# Patient Record
Sex: MALE | Race: BLACK OR AFRICAN AMERICAN | NOT HISPANIC OR LATINO | Employment: FULL TIME | URBAN - METROPOLITAN AREA
[De-identification: names, ages, dates, MRNs, and addresses within clinical notes are randomized per-mention and may not be internally consistent; named-entity substitution may affect disease eponyms.]

---

## 2019-10-24 RX ORDER — AZITHROMYCIN 250 MG/1
TABLET, FILM COATED ORAL
Qty: 6 TABLET | Refills: 0 | Status: SHIPPED | OUTPATIENT
Start: 2019-10-24 | End: 2019-10-29

## 2019-10-24 RX ORDER — PROMETHAZINE HYDROCHLORIDE AND CODEINE PHOSPHATE 6.25; 1 MG/5ML; MG/5ML
10 SOLUTION ORAL EVERY 6 HOURS PRN
Qty: 240 ML | Refills: 0 | Status: SHIPPED | OUTPATIENT
Start: 2019-10-24 | End: 2019-10-31

## 2019-12-12 DIAGNOSIS — I10 HTN (HYPERTENSION), BENIGN: Primary | ICD-10-CM

## 2019-12-12 RX ORDER — AMLODIPINE BESYLATE 5 MG/1
5 TABLET ORAL DAILY
Qty: 90 TABLET | Refills: 1 | Status: SHIPPED | OUTPATIENT
Start: 2019-12-12 | End: 2019-12-16 | Stop reason: SDUPTHER

## 2019-12-12 RX ORDER — AMLODIPINE BESYLATE 5 MG/1
5 TABLET ORAL DAILY
COMMUNITY
End: 2019-12-12 | Stop reason: SDUPTHER

## 2019-12-13 NOTE — TELEPHONE ENCOUNTER
The patient's prescription has been approved and sent to   [id number] - [place], [place] - [address] AT [facility] OF [facility] [address]  [address]  [place] [address]  Phone: [phone] Fax: [phone]

## 2019-12-16 DIAGNOSIS — I10 HTN (HYPERTENSION), BENIGN: ICD-10-CM

## 2019-12-16 RX ORDER — AMLODIPINE BESYLATE 5 MG/1
5 TABLET ORAL DAILY
Qty: 90 TABLET | Refills: 1 | Status: SHIPPED | OUTPATIENT
Start: 2019-12-16 | End: 2020-06-30 | Stop reason: SDUPTHER

## 2019-12-16 NOTE — TELEPHONE ENCOUNTER
----- Message from Ainsley Murcia sent at 12/16/2019  1:31 PM CST -----  Contact: Korey  The patient LMOR. He wanted his b/p medication filled. I called him back and LMOR AND said we need the name of his medication and what pharmacy he uses. pts # [phone] GH

## 2019-12-17 NOTE — TELEPHONE ENCOUNTER
The patient's prescription has been approved and sent to   [id number] - [place], [place] - [address] AT [facility] OF [facility] 43  [address]  [place] [address]  Phone: [phone] Fax: [phone]

## 2020-01-30 ENCOUNTER — TELEPHONE (OUTPATIENT)
Dept: FAMILY MEDICINE | Facility: CLINIC | Age: 60
End: 2020-01-30

## 2020-01-31 ENCOUNTER — OFFICE VISIT (OUTPATIENT)
Dept: FAMILY MEDICINE | Facility: CLINIC | Age: 60
End: 2020-01-31
Payer: COMMERCIAL

## 2020-01-31 DIAGNOSIS — Z79.899 LONG-TERM USE OF HIGH-RISK MEDICATION: ICD-10-CM

## 2020-01-31 DIAGNOSIS — J30.9 ALLERGIC RHINITIS, UNSPECIFIED SEASONALITY, UNSPECIFIED TRIGGER: ICD-10-CM

## 2020-01-31 DIAGNOSIS — Z13.6 SCREENING FOR ISCHEMIC HEART DISEASE (IHD): ICD-10-CM

## 2020-01-31 DIAGNOSIS — Z86.010 HX OF COLONIC POLYP: ICD-10-CM

## 2020-01-31 DIAGNOSIS — L30.9 DERMATITIS: ICD-10-CM

## 2020-01-31 DIAGNOSIS — Z13.89 SCREENING FOR BLOOD OR PROTEIN IN URINE: ICD-10-CM

## 2020-01-31 DIAGNOSIS — I10 HTN (HYPERTENSION), BENIGN: Primary | ICD-10-CM

## 2020-01-31 PROCEDURE — 3074F SYST BP LT 130 MM HG: CPT | Mod: S$GLB,,, | Performed by: FAMILY MEDICINE

## 2020-01-31 PROCEDURE — 99214 OFFICE O/P EST MOD 30 MIN: CPT | Mod: S$GLB,,, | Performed by: FAMILY MEDICINE

## 2020-01-31 PROCEDURE — 3078F PR MOST RECENT DIASTOLIC BLOOD PRESSURE < 80 MM HG: ICD-10-PCS | Mod: S$GLB,,, | Performed by: FAMILY MEDICINE

## 2020-01-31 PROCEDURE — 3078F DIAST BP <80 MM HG: CPT | Mod: S$GLB,,, | Performed by: FAMILY MEDICINE

## 2020-01-31 PROCEDURE — 99214 PR OFFICE/OUTPT VISIT, EST, LEVL IV, 30-39 MIN: ICD-10-PCS | Mod: S$GLB,,, | Performed by: FAMILY MEDICINE

## 2020-01-31 PROCEDURE — 3074F PR MOST RECENT SYSTOLIC BLOOD PRESSURE < 130 MM HG: ICD-10-PCS | Mod: S$GLB,,, | Performed by: FAMILY MEDICINE

## 2020-01-31 RX ORDER — KETOCONAZOLE 20 MG/G
CREAM TOPICAL DAILY
Qty: 30 G | Refills: 1 | Status: SHIPPED | OUTPATIENT
Start: 2020-01-31 | End: 2021-10-14

## 2020-01-31 RX ORDER — PNV NO.95/FERROUS FUM/FOLIC AC 28MG-0.8MG
TABLET ORAL
COMMUNITY
End: 2021-10-14

## 2020-01-31 RX ORDER — CIDER VINEGAR 300 MG
TABLET ORAL
COMMUNITY

## 2020-01-31 NOTE — PROGRESS NOTES
SUBJECTIVE:    Patient ID: Korey Aguero is a 59 y.o. male.    Chief Complaint: Rash (right arm x 2 months)    Pt here to checkup on chronic conditions.    BP is doing ok, denies CP/SOB.    Allergies are doing ok.  Has not been having to use nasal spray.       Has a rash on his right arm for the last two months.  It itches.  Do not       No FMHx of psoriasis      -------------------------------------------  cscope due May 2019, has been to see Dr. Srivastava.      No visits with results within 6 Month(s) from this visit.   Latest known visit with results is:   No results found for any previous visit.       History reviewed. No pertinent past medical history.  History reviewed. No pertinent surgical history.  History reviewed. No pertinent family history.    Marital Status:   Alcohol History:  has no alcohol history on file.  Tobacco History:  has no tobacco history on file.  Drug History:  has no drug history on file.    Review of patient's allergies indicates:   Allergen Reactions    Pcn [penicillins]        Current Outpatient Medications:     amLODIPine (NORVASC) 5 MG tablet, Take 1 tablet (5 mg total) by mouth once daily., Disp: 90 tablet, Rfl: 1    apple cider vinegar 300 mg Tab, Take by mouth., Disp: , Rfl:     krill-omega-3-dha-epa-lipids (KRILL OIL) 274-07-33-50 mg Cap, Take by mouth., Disp: , Rfl:     UNABLE TO FIND, Take 2 tablets by mouth once daily. [place], Disp: , Rfl:     ketoconazole (NIZORAL) 2 % cream, Apply topically once daily., Disp: 30 g, Rfl: 1    Review of Systems   Constitutional: Negative for appetite change, fatigue, fever and unexpected weight change.   Respiratory: Negative for cough, chest tightness, shortness of breath and wheezing.    Cardiovascular: Negative for chest pain and leg swelling.   Gastrointestinal: Negative for abdominal pain, constipation, nausea and vomiting.        -heartburn   Genitourinary: Negative for decreased urine volume, difficulty  "urinating, dysuria and frequency.   Musculoskeletal: Negative for arthralgias, back pain, myalgias and neck pain.   Skin: Positive for rash.   Neurological: Negative for dizziness, weakness, numbness and headaches.   Hematological: Does not bruise/bleed easily.   Psychiatric/Behavioral: Negative for dysphoric mood, sleep disturbance and suicidal ideas. The patient is not nervous/anxious.    All other systems reviewed and are negative.         Objective:      Vitals:    01/31/20 1040   BP: (P) 132/84   Pulse: (P) 65   SpO2: (P) 96%   Weight: (P) 109.8 kg (242 lb)   Height: (P) 5' 9.5" (1.765 m)     Body mass index is 35.22 kg/m² (pended).     Physical Exam   Constitutional: He is oriented to person, place, and time. He appears well-developed and well-nourished. No distress.   obese   HENT:   Head: Normocephalic and atraumatic.   Neck: Neck supple. No thyromegaly present.   Cardiovascular: Normal rate, regular rhythm and normal heart sounds. Exam reveals no friction rub.   No murmur heard.  Pulmonary/Chest: Effort normal and breath sounds normal. He has no wheezes. He has no rales.   Abdominal: Soft. Bowel sounds are normal. He exhibits no distension. There is no tenderness.   Musculoskeletal: He exhibits no edema.   Lymphadenopathy:     He has no cervical adenopathy.   Neurological: He is alert and oriented to person, place, and time.   Skin: Skin is warm and dry. Rash noted. Rash is macular.   Hyperpigmented rectangular shaped macular lesion about 1cm  with mild scale on the right elbow and a smaller satellite lesion near the wrist.   Psychiatric: He has a normal mood and affect. His speech is normal and behavior is normal. Judgment and thought content normal. He is attentive.   Vitals reviewed.        Assessment:       1. HTN (hypertension), benign    2. Hx of colonic polyp    3. Dermatitis    4. Allergic rhinitis, unspecified seasonality, unspecified trigger    5. Long-term use of high-risk medication    6. " Screening for blood or protein in urine    7. Screening for ischemic heart disease (IHD)         Plan:       HTN (hypertension), benign  Comments:  Controlled. Will continue to monitor    Hx of colonic polyp  Comments:  Referred to GI for cscope  Orders:  -     Ambulatory Referral to Gastroenterology    Dermatitis  Comments:  Active. Has failed steroid.  Will try lotrisone. Will monitor response  Orders:  -     ketoconazole (NIZORAL) 2 % cream; Apply topically once daily.  Dispense: 30 g; Refill: 1    Allergic rhinitis, unspecified seasonality, unspecified trigger  Comments:  Controlled. Will continue to monitor    Long-term use of high-risk medication  -     Comprehensive metabolic panel; Future; Expected date: 01/31/2020  -     CBC auto differential; Future; Expected date: 01/31/2020    Screening for blood or protein in urine  -     Microalbumin/creatinine urine ratio; Future; Expected date: 01/31/2020  -     Urinalysis; Future; Expected date: 01/31/2020    Screening for ischemic heart disease (IHD)  -     Lipid panel; Future; Expected date: 01/31/2020    Labs have been ordered for monitoring of chronic conditions, just before next visit.    Follow up in about 3 months (around 4/30/2020) for HTN, Allergies.

## 2020-02-29 LAB
ALBUMIN SERPL-MCNC: 4.5 G/DL (ref 3.6–5.1)
ALBUMIN/CREAT UR: 3 MCG/MG CREAT
ALBUMIN/GLOB SERPL: 2 (CALC) (ref 1–2.5)
ALP SERPL-CCNC: 66 U/L (ref 35–144)
ALT SERPL-CCNC: 26 U/L (ref 9–46)
APPEARANCE UR: CLEAR
AST SERPL-CCNC: 20 U/L (ref 10–35)
BASOPHILS # BLD AUTO: 30 CELLS/UL (ref 0–200)
BASOPHILS NFR BLD AUTO: 0.4 %
BILIRUB SERPL-MCNC: 0.5 MG/DL (ref 0.2–1.2)
BILIRUB UR QL STRIP: NEGATIVE
BUN SERPL-MCNC: 17 MG/DL (ref 7–25)
BUN/CREAT SERPL: ABNORMAL (CALC) (ref 6–22)
CALCIUM SERPL-MCNC: 9.5 MG/DL (ref 8.6–10.3)
CHLORIDE SERPL-SCNC: 104 MMOL/L (ref 98–110)
CHOLEST SERPL-MCNC: 211 MG/DL
CHOLEST/HDLC SERPL: 4.7 (CALC)
CO2 SERPL-SCNC: 31 MMOL/L (ref 20–32)
COLOR UR: YELLOW
CREAT SERPL-MCNC: 0.95 MG/DL (ref 0.7–1.33)
CREAT UR-MCNC: 170 MG/DL (ref 20–320)
EOSINOPHIL # BLD AUTO: 222 CELLS/UL (ref 15–500)
EOSINOPHIL NFR BLD AUTO: 3 %
ERYTHROCYTE [DISTWIDTH] IN BLOOD BY AUTOMATED COUNT: 14.1 % (ref 11–15)
GFRSERPLBLD MDRD-ARVRAT: 88 ML/MIN/1.73M2
GLOBULIN SER CALC-MCNC: 2.3 G/DL (CALC) (ref 1.9–3.7)
GLUCOSE SERPL-MCNC: 101 MG/DL (ref 65–99)
GLUCOSE UR QL STRIP: NEGATIVE
HCT VFR BLD AUTO: 46.9 % (ref 38.5–50)
HDLC SERPL-MCNC: 45 MG/DL
HGB BLD-MCNC: 14.9 G/DL (ref 13.2–17.1)
HGB UR QL STRIP: NEGATIVE
KETONES UR QL STRIP: NEGATIVE
LDLC SERPL CALC-MCNC: 132 MG/DL (CALC)
LEUKOCYTE ESTERASE UR QL STRIP: NEGATIVE
LYMPHOCYTES # BLD AUTO: 2819 CELLS/UL (ref 850–3900)
LYMPHOCYTES NFR BLD AUTO: 38.1 %
MCH RBC QN AUTO: 29.4 PG (ref 27–33)
MCHC RBC AUTO-ENTMCNC: 31.8 G/DL (ref 32–36)
MCV RBC AUTO: 92.5 FL (ref 80–100)
MICROALBUMIN UR-MCNC: 0.5 MG/DL
MONOCYTES # BLD AUTO: 570 CELLS/UL (ref 200–950)
MONOCYTES NFR BLD AUTO: 7.7 %
NEUTROPHILS # BLD AUTO: 3759 CELLS/UL (ref 1500–7800)
NEUTROPHILS NFR BLD AUTO: 50.8 %
NITRITE UR QL STRIP: NEGATIVE
NONHDLC SERPL-MCNC: 166 MG/DL (CALC)
PH UR STRIP: 5.5 [PH] (ref 5–8)
PLATELET # BLD AUTO: 245 THOUSAND/UL (ref 140–400)
PMV BLD REES-ECKER: 11.3 FL (ref 7.5–12.5)
POTASSIUM SERPL-SCNC: 4.5 MMOL/L (ref 3.5–5.3)
PROT SERPL-MCNC: 6.8 G/DL (ref 6.1–8.1)
PROT UR QL STRIP: NEGATIVE
RBC # BLD AUTO: 5.07 MILLION/UL (ref 4.2–5.8)
SODIUM SERPL-SCNC: 142 MMOL/L (ref 135–146)
SP GR UR STRIP: 1.02 (ref 1–1.03)
TRIGL SERPL-MCNC: 203 MG/DL
WBC # BLD AUTO: 7.4 THOUSAND/UL (ref 3.8–10.8)

## 2020-06-30 DIAGNOSIS — I10 HTN (HYPERTENSION), BENIGN: ICD-10-CM

## 2020-06-30 RX ORDER — AMLODIPINE BESYLATE 5 MG/1
5 TABLET ORAL DAILY
Qty: 90 TABLET | Refills: 1 | Status: ON HOLD | OUTPATIENT
Start: 2020-06-30 | End: 2020-12-15 | Stop reason: SDUPTHER

## 2020-06-30 NOTE — TELEPHONE ENCOUNTER
The patient's prescription has been approved and sent to   [id number] - [place], [place] - [address] AT [facility] OF [facility] 43  [address]  [place] [address]  Phone: [phone] Fax: [phone]

## 2020-06-30 NOTE — TELEPHONE ENCOUNTER
----- Message from Martín Fitzgerald sent at 6/30/2020 11:18 AM CDT -----  Regarding: refills  Bp med   Pharm [facility]yle [facility]   Pt [phone]

## 2020-10-28 ENCOUNTER — TELEPHONE (OUTPATIENT)
Dept: FAMILY MEDICINE | Facility: CLINIC | Age: 60
End: 2020-10-28

## 2020-11-30 ENCOUNTER — OFFICE VISIT (OUTPATIENT)
Dept: FAMILY MEDICINE | Facility: CLINIC | Age: 60
End: 2020-11-30
Payer: COMMERCIAL

## 2020-11-30 VITALS
TEMPERATURE: 98 F | HEART RATE: 77 BPM | BODY MASS INDEX: 33.7 KG/M2 | WEIGHT: 235.38 LBS | DIASTOLIC BLOOD PRESSURE: 80 MMHG | HEIGHT: 70 IN | SYSTOLIC BLOOD PRESSURE: 110 MMHG

## 2020-11-30 DIAGNOSIS — Z13.29 SCREENING FOR ENDOCRINE DISORDER: ICD-10-CM

## 2020-11-30 DIAGNOSIS — Z13.6 SCREENING FOR ISCHEMIC HEART DISEASE (IHD): ICD-10-CM

## 2020-11-30 DIAGNOSIS — L30.9 DERMATITIS: ICD-10-CM

## 2020-11-30 DIAGNOSIS — Z13.89 SCREENING FOR BLOOD OR PROTEIN IN URINE: ICD-10-CM

## 2020-11-30 DIAGNOSIS — I10 HTN (HYPERTENSION), BENIGN: Primary | ICD-10-CM

## 2020-11-30 DIAGNOSIS — L72.3 SEBACEOUS CYST: ICD-10-CM

## 2020-11-30 DIAGNOSIS — Z79.899 LONG-TERM USE OF HIGH-RISK MEDICATION: ICD-10-CM

## 2020-11-30 PROCEDURE — 3079F DIAST BP 80-89 MM HG: CPT | Mod: S$GLB,,, | Performed by: FAMILY MEDICINE

## 2020-11-30 PROCEDURE — 3008F PR BODY MASS INDEX (BMI) DOCUMENTED: ICD-10-PCS | Mod: S$GLB,,, | Performed by: FAMILY MEDICINE

## 2020-11-30 PROCEDURE — 99214 OFFICE O/P EST MOD 30 MIN: CPT | Mod: S$GLB,,, | Performed by: FAMILY MEDICINE

## 2020-11-30 PROCEDURE — 3074F SYST BP LT 130 MM HG: CPT | Mod: S$GLB,,, | Performed by: FAMILY MEDICINE

## 2020-11-30 PROCEDURE — 3074F PR MOST RECENT SYSTOLIC BLOOD PRESSURE < 130 MM HG: ICD-10-PCS | Mod: S$GLB,,, | Performed by: FAMILY MEDICINE

## 2020-11-30 PROCEDURE — 3008F BODY MASS INDEX DOCD: CPT | Mod: S$GLB,,, | Performed by: FAMILY MEDICINE

## 2020-11-30 PROCEDURE — 99214 PR OFFICE/OUTPT VISIT, EST, LEVL IV, 30-39 MIN: ICD-10-PCS | Mod: S$GLB,,, | Performed by: FAMILY MEDICINE

## 2020-11-30 PROCEDURE — 3079F PR MOST RECENT DIASTOLIC BLOOD PRESSURE 80-89 MM HG: ICD-10-PCS | Mod: S$GLB,,, | Performed by: FAMILY MEDICINE

## 2020-11-30 NOTE — PROGRESS NOTES
SUBJECTIVE:    Patient ID: Korey Aguero is a 60 y.o. male.    Chief Complaint: Hypertension (flu out of stock/ zoster due/ Dr Srivastava-ALLEN)    Pt here to checkup on chronic conditions.      BP is doing ok, denies CP/SOB.  Has lost about 7 pounds since last visit. Has been eating better and walking a few miles twice a day.    Allergies are doing ok.     Right arm rash not bothering him right now. Doesn't have to use it much. Has to avoid band-aides, breaks out from them    Sebaceous cyst has returned to mid back.  His wife squeezed some of the substance out and gave it some relief.        -------------------------------------------  cscope due May 2019, has been to see Dr. Srivastava.      No visits with results within 6 Month(s) from this visit.   Latest known visit with results is:   Office Visit on 01/31/2020   Component Date Value Ref Range Status    Glucose 02/28/2020 101* 65 - 99 mg/dL Final    BUN 02/28/2020 17  7 - 25 mg/dL Final    Creatinine 02/28/2020 0.95  0.70 - 1.33 mg/dL Final    eGFR if non African American 02/28/2020 88  > OR = 60 mL/min/1.73m2 Final    eGFR if African American 02/28/2020 102  > OR = 60 mL/min/1.73m2 Final    BUN/Creatinine Ratio 02/28/2020 NOT APPLICABLE  6 - 22 (calc) Final    Sodium 02/28/2020 142  135 - 146 mmol/L Final    Potassium 02/28/2020 4.5  3.5 - 5.3 mmol/L Final    Chloride 02/28/2020 104  98 - 110 mmol/L Final    CO2 02/28/2020 31  20 - 32 mmol/L Final    Calcium 02/28/2020 9.5  8.6 - 10.3 mg/dL Final    Total Protein 02/28/2020 6.8  6.1 - 8.1 g/dL Final    Albumin 02/28/2020 4.5  3.6 - 5.1 g/dL Final    Globulin, Total 02/28/2020 2.3  1.9 - 3.7 g/dL (calc) Final    Albumin/Globulin Ratio 02/28/2020 2.0  1.0 - 2.5 (calc) Final    Total Bilirubin 02/28/2020 0.5  0.2 - 1.2 mg/dL Final    Alkaline Phosphatase 02/28/2020 66  35 - 144 U/L Final    AST 02/28/2020 20  10 - 35 U/L Final    ALT 02/28/2020 26  9 - 46 U/L Final    Cholesterol 02/28/2020 211*  <200 mg/dL Final    HDL 02/28/2020 45  > OR = 40 mg/dL Final    Triglycerides 02/28/2020 203* <150 mg/dL Final    LDL Cholesterol 02/28/2020 132* mg/dL (calc) Final    HDL/Cholesterol Ratio 02/28/2020 4.7  <5.0 (calc) Final    Non HDL Chol. (LDL+VLDL) 02/28/2020 166* <130 mg/dL (calc) Final    Creatinine, Urine 02/28/2020 170  20 - 320 mg/dL Final    Microalb, Ur 02/28/2020 0.5  See Note: mg/dL Final    Microalb/Creat Ratio 02/28/2020 3  <30 mcg/mg creat Final    WBC 02/28/2020 7.4  3.8 - 10.8 Thousand/uL Final    RBC 02/28/2020 5.07  4.20 - 5.80 Million/uL Final    Hemoglobin 02/28/2020 14.9  13.2 - 17.1 g/dL Final    Hematocrit 02/28/2020 46.9  38.5 - 50.0 % Final    MCV 02/28/2020 92.5  80.0 - 100.0 fL Final    MCH 02/28/2020 29.4  27.0 - 33.0 pg Final    MCHC 02/28/2020 31.8* 32.0 - 36.0 g/dL Final    RDW 02/28/2020 14.1  11.0 - 15.0 % Final    Platelets 02/28/2020 245  140 - 400 Thousand/uL Final    MPV 02/28/2020 11.3  7.5 - 12.5 fL Final    Neutrophils Absolute 02/28/2020 3,759  1,500 - 7,800 cells/uL Final    Lymph # 02/28/2020 2,819  850 - 3,900 cells/uL Final    Mono # 02/28/2020 570  200 - 950 cells/uL Final    Eos # 02/28/2020 222  15 - 500 cells/uL Final    Baso # 02/28/2020 30  0 - 200 cells/uL Final    Neutrophils Relative 02/28/2020 50.8  % Final    Lymph % 02/28/2020 38.1  % Final    Mono % 02/28/2020 7.7  % Final    Eosinophil % 02/28/2020 3.0  % Final    Basophil % 02/28/2020 0.4  % Final    Color, UA 02/28/2020 YELLOW  YELLOW Final    Appearance, UA 02/28/2020 CLEAR  CLEAR Final    Specific Gravity, UA 02/28/2020 1.022  1.001 - 1.035 Final    pH, UA 02/28/2020 5.5  5.0 - 8.0 Final    Glucose, UA 02/28/2020 NEGATIVE  NEGATIVE Final    Bilirubin, UA 02/28/2020 NEGATIVE  NEGATIVE Final    Ketones, UA 02/28/2020 NEGATIVE  NEGATIVE Final    Occult Blood UA 02/28/2020 NEGATIVE  NEGATIVE Final    Protein, UA 02/28/2020 NEGATIVE  NEGATIVE Final    Nitrite, UA  02/28/2020 NEGATIVE  NEGATIVE Final    Leukocytes, UA 02/28/2020 NEGATIVE  NEGATIVE Final       History reviewed. No pertinent past medical history.  Social History     Socioeconomic History    Marital status:      Spouse name: Not on file    Number of children: Not on file    Years of education: Not on file    Highest education level: Not on file   Occupational History    Not on file   Social Needs    Financial resource strain: Not on file    Food insecurity     Worry: Not on file     Inability: Not on file    Transportation needs     Medical: Not on file     Non-medical: Not on file   Tobacco Use    Smoking status: Never Smoker   Substance and Sexual Activity    Alcohol use: Not on file    Drug use: Not on file    Sexual activity: Not on file   Lifestyle    Physical activity     Days per week: Not on file     Minutes per session: Not on file    Stress: Not on file   Relationships    Social connections     Talks on phone: Not on file     Gets together: Not on file     Attends Anabaptism service: Not on file     Active member of club or organization: Not on file     Attends meetings of clubs or organizations: Not on file     Relationship status: Not on file   Other Topics Concern    Not on file   Social History Narrative    Not on file     History reviewed. No pertinent surgical history.  History reviewed. No pertinent family history.    Review of patient's allergies indicates:   Allergen Reactions    Adhesive      Band-aide    Pcn [penicillins]        Current Outpatient Medications:     amLODIPine (NORVASC) 5 MG tablet, Take 1 tablet (5 mg total) by mouth once daily., Disp: 90 tablet, Rfl: 1    apple cider vinegar 300 mg Tab, Take by mouth., Disp: , Rfl:     ketoconazole (NIZORAL) 2 % cream, Apply topically once daily., Disp: 30 g, Rfl: 1    krill-omega-3-dha-epa-lipids (KRILL OIL) 072-57-82-50 mg Cap, Take by mouth., Disp: , Rfl:     UNABLE TO FIND, Take 2 tablets by mouth once  "daily. [place] [employer], Disp: , Rfl:     Review of Systems   Constitutional: Negative for appetite change, fatigue, fever and unexpected weight change.   Respiratory: Negative for cough, chest tightness, shortness of breath and wheezing.    Cardiovascular: Negative for chest pain and leg swelling.   Gastrointestinal: Negative for abdominal pain, constipation, nausea and vomiting.        -heartburn   Genitourinary: Negative for decreased urine volume, difficulty urinating, dysuria and frequency.   Musculoskeletal: Negative for arthralgias, back pain, myalgias and neck pain.   Skin: Positive for rash.   Neurological: Negative for dizziness, weakness, numbness and headaches.   Hematological: Does not bruise/bleed easily.   Psychiatric/Behavioral: Negative for dysphoric mood, sleep disturbance and suicidal ideas. The patient is not nervous/anxious.    All other systems reviewed and are negative.         Objective:      Vitals:    11/30/20 0759   BP: 110/80   Pulse: 77   Temp: 98.2 °F (36.8 °C)   Weight: 106.8 kg (235 lb 6.4 oz)   Height: 5' 9.5" (1.765 m)     Wt Readings from Last 3 Encounters:   11/30/20 106.8 kg (235 lb 6.4 oz)   01/31/20 (P) 109.8 kg (242 lb)       Physical Exam  Vitals signs reviewed.   Constitutional:       General: He is not in acute distress.     Appearance: Normal appearance. He is well-developed.   HENT:      Head: Normocephalic and atraumatic.   Neck:      Musculoskeletal: Neck supple.      Thyroid: No thyromegaly.   Cardiovascular:      Rate and Rhythm: Normal rate and regular rhythm.      Heart sounds: Normal heart sounds. No murmur. No friction rub.   Pulmonary:      Effort: Pulmonary effort is normal.      Breath sounds: Normal breath sounds. No wheezing or rales.   Abdominal:      General: Bowel sounds are normal. There is no distension.      Palpations: Abdomen is soft.      Tenderness: There is no abdominal tenderness.   Lymphadenopathy:      Cervical: No cervical adenopathy.   Skin:   "   General: Skin is warm and dry.      Findings: No rash.   Neurological:      Mental Status: He is alert and oriented to person, place, and time.   Psychiatric:         Attention and Perception: He is attentive.         Speech: Speech normal.         Behavior: Behavior normal.         Thought Content: Thought content normal.         Judgment: Judgment normal.           Assessment:       1. HTN (hypertension), benign    2. Sebaceous cyst    3. Dermatitis    4. Long-term use of high-risk medication    5. Screening for ischemic heart disease (IHD)    6. Screening for blood or protein in urine    7. Screening for endocrine disorder         Plan:       HTN (hypertension), benign  Comments:  Controlled. Will continue to monitor BP  Orders:  -     Comprehensive Metabolic Panel; Future; Expected date: 11/30/2020  -     Lipid Panel; Future; Expected date: 11/30/2020  -     Microalbumin/Creatinine Ratio, Urine; Future; Expected date: 11/30/2020  -     Urinalysis; Future; Expected date: 11/30/2020  -     CBC Auto Differential; Future; Expected date: 11/30/2020    Sebaceous cyst  Comments:  Recurrent. Will continue to monitor for infection    Dermatitis  Comments:  Chronic. To continue to monitor on prn steroid.    Long-term use of high-risk medication  -     Comprehensive Metabolic Panel; Future; Expected date: 11/30/2020  -     Lipid Panel; Future; Expected date: 11/30/2020  -     Microalbumin/Creatinine Ratio, Urine; Future; Expected date: 11/30/2020  -     Urinalysis; Future; Expected date: 11/30/2020  -     CBC Auto Differential; Future; Expected date: 11/30/2020    Screening for ischemic heart disease (IHD)  -     Comprehensive Metabolic Panel; Future; Expected date: 11/30/2020    Screening for blood or protein in urine  -     Microalbumin/Creatinine Ratio, Urine; Future; Expected date: 11/30/2020  -     Urinalysis; Future; Expected date: 11/30/2020    Screening for endocrine disorder  -     TSH w/reflex to FT4; Future;  Expected date: 11/30/2020    Labs have been ordered for monitoring of chronic conditions, just before next visit.    Follow up in about 6 months (around 5/30/2021) for HTN, Dermatitis.        11/30/2020 Nader Valiente

## 2020-12-13 ENCOUNTER — HOSPITAL ENCOUNTER (INPATIENT)
Facility: HOSPITAL | Age: 60
LOS: 2 days | Discharge: HOME | DRG: 871 | End: 2020-12-15
Attending: INTERNAL MEDICINE | Admitting: INTERNAL MEDICINE
Payer: COMMERCIAL

## 2020-12-13 DIAGNOSIS — R06.02 SHORTNESS OF BREATH: ICD-10-CM

## 2020-12-13 DIAGNOSIS — I20.0 UNSTABLE ANGINA: ICD-10-CM

## 2020-12-13 DIAGNOSIS — I46.9 CARDIAC ARREST: ICD-10-CM

## 2020-12-13 DIAGNOSIS — I10 HTN (HYPERTENSION), BENIGN: ICD-10-CM

## 2020-12-13 DIAGNOSIS — J96.01 ACUTE HYPOXEMIC RESPIRATORY FAILURE: Primary | ICD-10-CM

## 2020-12-13 PROBLEM — A41.9 SEPSIS: Status: ACTIVE | Noted: 2020-12-13

## 2020-12-13 PROBLEM — E72.20 SERUM AMMONIA INCREASED: Status: ACTIVE | Noted: 2020-12-13

## 2020-12-13 PROBLEM — J18.9 LEFT LOWER LOBE PNEUMONIA: Status: ACTIVE | Noted: 2020-12-13

## 2020-12-13 PROBLEM — R07.9 CHEST PAIN: Status: ACTIVE | Noted: 2020-12-13

## 2020-12-13 PROBLEM — Z99.11 ON MECHANICALLY ASSISTED VENTILATION: Status: ACTIVE | Noted: 2020-12-13

## 2020-12-13 LAB
ALBUMIN SERPL BCP-MCNC: 3.7 G/DL (ref 3.5–5.2)
ALLENS TEST: ABNORMAL
ALP SERPL-CCNC: 75 U/L (ref 55–135)
ALT SERPL W/O P-5'-P-CCNC: 28 U/L (ref 10–44)
ANION GAP SERPL CALC-SCNC: 14 MMOL/L (ref 8–16)
APAP SERPL-MCNC: <3 UG/ML (ref 10–20)
APTT BLDCRRT: 24.8 SEC (ref 21–32)
AST SERPL-CCNC: 22 U/L (ref 10–40)
BASOPHILS # BLD AUTO: 0.01 K/UL (ref 0–0.2)
BASOPHILS NFR BLD: 0.1 % (ref 0–1.9)
BILIRUB SERPL-MCNC: 0.4 MG/DL (ref 0.1–1)
BILIRUB UR QL STRIP: NEGATIVE
BNP SERPL-MCNC: 39 PG/ML (ref 0–99)
BUN SERPL-MCNC: 7 MG/DL (ref 6–20)
CALCIUM SERPL-MCNC: 8 MG/DL (ref 8.7–10.5)
CHLORIDE SERPL-SCNC: 104 MMOL/L (ref 95–110)
CHOLEST SERPL-MCNC: 183 MG/DL (ref 120–199)
CHOLEST/HDLC SERPL: 4.7 {RATIO} (ref 2–5)
CK SERPL-CCNC: 123 U/L (ref 20–200)
CLARITY UR REFRACT.AUTO: CLEAR
CO2 SERPL-SCNC: 20 MMOL/L (ref 23–29)
COLOR UR AUTO: ABNORMAL
CREAT SERPL-MCNC: 0.7 MG/DL (ref 0.5–1.4)
D DIMER PPP IA.FEU-MCNC: 0.61 MG/L FEU
DELSYS: ABNORMAL
DIFFERENTIAL METHOD: ABNORMAL
EOSINOPHIL # BLD AUTO: 0 K/UL (ref 0–0.5)
EOSINOPHIL NFR BLD: 0 % (ref 0–8)
ERYTHROCYTE [DISTWIDTH] IN BLOOD BY AUTOMATED COUNT: 13.4 % (ref 11.5–14.5)
ERYTHROCYTE [SEDIMENTATION RATE] IN BLOOD BY WESTERGREN METHOD: 16 MM/H
EST. GFR  (AFRICAN AMERICAN): >60 ML/MIN/1.73 M^2
EST. GFR  (NON AFRICAN AMERICAN): >60 ML/MIN/1.73 M^2
ESTIMATED AVG GLUCOSE: 114 MG/DL (ref 68–131)
ETHANOL SERPL-MCNC: <10 MG/DL
FIO2: 50
GLUCOSE SERPL-MCNC: 121 MG/DL (ref 70–110)
GLUCOSE UR QL STRIP: NEGATIVE
HBA1C MFR BLD HPLC: 5.6 % (ref 4–5.6)
HCO3 UR-SCNC: 25.3 MMOL/L (ref 24–28)
HCT VFR BLD AUTO: 42.9 % (ref 40–54)
HDLC SERPL-MCNC: 39 MG/DL (ref 40–75)
HDLC SERPL: 21.3 % (ref 20–50)
HGB BLD-MCNC: 13.8 G/DL (ref 14–18)
HGB UR QL STRIP: ABNORMAL
IMM GRANULOCYTES # BLD AUTO: 0.07 K/UL (ref 0–0.04)
IMM GRANULOCYTES NFR BLD AUTO: 0.4 % (ref 0–0.5)
INR PPP: 1 (ref 0.8–1.2)
KETONES UR QL STRIP: NEGATIVE
LACTATE SERPL-SCNC: 3.2 MMOL/L (ref 0.5–2.2)
LDLC SERPL CALC-MCNC: 107.6 MG/DL (ref 63–159)
LEUKOCYTE ESTERASE UR QL STRIP: NEGATIVE
LYMPHOCYTES # BLD AUTO: 1 K/UL (ref 1–4.8)
LYMPHOCYTES NFR BLD: 6.3 % (ref 18–48)
MCH RBC QN AUTO: 30.3 PG (ref 27–31)
MCHC RBC AUTO-ENTMCNC: 32.2 G/DL (ref 32–36)
MCV RBC AUTO: 94 FL (ref 82–98)
MICROSCOPIC COMMENT: ABNORMAL
MODE: ABNORMAL
MONOCYTES # BLD AUTO: 0.4 K/UL (ref 0.3–1)
MONOCYTES NFR BLD: 2.4 % (ref 4–15)
NEUTROPHILS # BLD AUTO: 14.5 K/UL (ref 1.8–7.7)
NEUTROPHILS NFR BLD: 90.8 % (ref 38–73)
NITRITE UR QL STRIP: NEGATIVE
NONHDLC SERPL-MCNC: 144 MG/DL
NRBC BLD-RTO: 0 /100 WBC
PCO2 BLDA: 45.7 MMHG (ref 35–45)
PEEP: 5
PH SMN: 7.35 [PH] (ref 7.35–7.45)
PH UR STRIP: 6 [PH] (ref 5–8)
PLATELET # BLD AUTO: 237 K/UL (ref 150–350)
PMV BLD AUTO: 10.7 FL (ref 9.2–12.9)
PO2 BLDA: 115 MMHG (ref 80–100)
POC BE: 0 MMOL/L
POC SATURATED O2: 98 % (ref 95–100)
POC TCO2: 27 MMOL/L (ref 23–27)
POCT GLUCOSE: 116 MG/DL (ref 70–110)
POTASSIUM SERPL-SCNC: 4.2 MMOL/L (ref 3.5–5.1)
PROT SERPL-MCNC: 6.7 G/DL (ref 6–8.4)
PROT UR QL STRIP: NEGATIVE
PROTHROMBIN TIME: 10.9 SEC (ref 9–12.5)
RBC # BLD AUTO: 4.56 M/UL (ref 4.6–6.2)
RBC #/AREA URNS AUTO: 15 /HPF (ref 0–4)
SALICYLATES SERPL-MCNC: <5 MG/DL (ref 15–30)
SAMPLE: ABNORMAL
SITE: ABNORMAL
SODIUM SERPL-SCNC: 138 MMOL/L (ref 136–145)
SP GR UR STRIP: 1.01 (ref 1–1.03)
T4 FREE SERPL-MCNC: 0.88 NG/DL (ref 0.71–1.51)
TRIGL SERPL-MCNC: 182 MG/DL (ref 30–150)
TROPONIN I SERPL DL<=0.01 NG/ML-MCNC: <0.006 NG/ML (ref 0–0.03)
TSH SERPL DL<=0.005 MIU/L-ACNC: 0.25 UIU/ML (ref 0.4–4)
URN SPEC COLLECT METH UR: ABNORMAL
VT: 500
WBC # BLD AUTO: 16 K/UL (ref 3.9–12.7)
WBC #/AREA URNS AUTO: 0 /HPF (ref 0–5)

## 2020-12-13 PROCEDURE — 27100108

## 2020-12-13 PROCEDURE — 27200966 HC CLOSED SUCTION SYSTEM

## 2020-12-13 PROCEDURE — 94761 N-INVAS EAR/PLS OXIMETRY MLT: CPT

## 2020-12-13 PROCEDURE — 82550 ASSAY OF CK (CPK): CPT

## 2020-12-13 PROCEDURE — 25000003 PHARM REV CODE 250: Performed by: INTERNAL MEDICINE

## 2020-12-13 PROCEDURE — 94002 VENT MGMT INPAT INIT DAY: CPT

## 2020-12-13 PROCEDURE — 80320 DRUG SCREEN QUANTALCOHOLS: CPT

## 2020-12-13 PROCEDURE — 20000000 HC ICU ROOM

## 2020-12-13 PROCEDURE — 85025 COMPLETE CBC W/AUTO DIFF WBC: CPT

## 2020-12-13 PROCEDURE — 87205 SMEAR GRAM STAIN: CPT

## 2020-12-13 PROCEDURE — 83036 HEMOGLOBIN GLYCOSYLATED A1C: CPT

## 2020-12-13 PROCEDURE — 84443 ASSAY THYROID STIM HORMONE: CPT

## 2020-12-13 PROCEDURE — 63600175 PHARM REV CODE 636 W HCPCS: Performed by: STUDENT IN AN ORGANIZED HEALTH CARE EDUCATION/TRAINING PROGRAM

## 2020-12-13 PROCEDURE — 87070 CULTURE OTHR SPECIMN AEROBIC: CPT

## 2020-12-13 PROCEDURE — 85379 FIBRIN DEGRADATION QUANT: CPT

## 2020-12-13 PROCEDURE — 80053 COMPREHEN METABOLIC PANEL: CPT

## 2020-12-13 PROCEDURE — 27000221 HC OXYGEN, UP TO 24 HOURS

## 2020-12-13 PROCEDURE — 80329 ANALGESICS NON-OPIOID 1 OR 2: CPT

## 2020-12-13 PROCEDURE — 85730 THROMBOPLASTIN TIME PARTIAL: CPT

## 2020-12-13 PROCEDURE — 80307 DRUG TEST PRSMV CHEM ANLYZR: CPT

## 2020-12-13 PROCEDURE — 80061 LIPID PANEL: CPT

## 2020-12-13 PROCEDURE — 81001 URINALYSIS AUTO W/SCOPE: CPT

## 2020-12-13 PROCEDURE — 83605 ASSAY OF LACTIC ACID: CPT

## 2020-12-13 PROCEDURE — 84439 ASSAY OF FREE THYROXINE: CPT

## 2020-12-13 PROCEDURE — 87040 BLOOD CULTURE FOR BACTERIA: CPT | Mod: 59

## 2020-12-13 PROCEDURE — 99223 PR INITIAL HOSPITAL CARE,LEVL III: ICD-10-PCS | Mod: ,,, | Performed by: INTERNAL MEDICINE

## 2020-12-13 PROCEDURE — 83880 ASSAY OF NATRIURETIC PEPTIDE: CPT

## 2020-12-13 PROCEDURE — 99223 1ST HOSP IP/OBS HIGH 75: CPT | Mod: ,,, | Performed by: INTERNAL MEDICINE

## 2020-12-13 PROCEDURE — 25000003 PHARM REV CODE 250: Performed by: STUDENT IN AN ORGANIZED HEALTH CARE EDUCATION/TRAINING PROGRAM

## 2020-12-13 PROCEDURE — 84484 ASSAY OF TROPONIN QUANT: CPT | Mod: 91

## 2020-12-13 PROCEDURE — 99900035 HC TECH TIME PER 15 MIN (STAT)

## 2020-12-13 PROCEDURE — 85610 PROTHROMBIN TIME: CPT

## 2020-12-13 PROCEDURE — 36415 COLL VENOUS BLD VENIPUNCTURE: CPT

## 2020-12-13 RX ORDER — AMLODIPINE BESYLATE 5 MG/1
5 TABLET ORAL DAILY
Status: DISCONTINUED | OUTPATIENT
Start: 2020-12-14 | End: 2020-12-14

## 2020-12-13 RX ORDER — SODIUM CHLORIDE 0.9 % (FLUSH) 0.9 %
10 SYRINGE (ML) INJECTION
Status: DISCONTINUED | OUTPATIENT
Start: 2020-12-13 | End: 2020-12-15 | Stop reason: HOSPADM

## 2020-12-13 RX ORDER — INSULIN ASPART 100 [IU]/ML
0-5 INJECTION, SOLUTION INTRAVENOUS; SUBCUTANEOUS EVERY 6 HOURS PRN
Status: DISCONTINUED | OUTPATIENT
Start: 2020-12-13 | End: 2020-12-15 | Stop reason: HOSPADM

## 2020-12-13 RX ORDER — PROPOFOL 10 MG/ML
0-50 INJECTION, EMULSION INTRAVENOUS CONTINUOUS
Status: DISCONTINUED | OUTPATIENT
Start: 2020-12-13 | End: 2020-12-14

## 2020-12-13 RX ORDER — FENTANYL CITRATE-0.9 % NACL/PF 10 MCG/ML
0-200 PLASTIC BAG, INJECTION (ML) INTRAVENOUS CONTINUOUS
Status: DISCONTINUED | OUTPATIENT
Start: 2020-12-13 | End: 2020-12-14

## 2020-12-13 RX ORDER — FAMOTIDINE 10 MG/ML
20 INJECTION INTRAVENOUS 2 TIMES DAILY
Status: DISCONTINUED | OUTPATIENT
Start: 2020-12-13 | End: 2020-12-15 | Stop reason: HOSPADM

## 2020-12-13 RX ORDER — MUPIROCIN 20 MG/G
OINTMENT TOPICAL 2 TIMES DAILY
Status: DISCONTINUED | OUTPATIENT
Start: 2020-12-13 | End: 2020-12-15 | Stop reason: HOSPADM

## 2020-12-13 RX ORDER — GLUCAGON 1 MG
1 KIT INJECTION
Status: DISCONTINUED | OUTPATIENT
Start: 2020-12-13 | End: 2020-12-14

## 2020-12-13 RX ORDER — NOREPINEPHRINE BITARTRATE/D5W 4MG/250ML
0-3 PLASTIC BAG, INJECTION (ML) INTRAVENOUS CONTINUOUS
Status: DISCONTINUED | OUTPATIENT
Start: 2020-12-13 | End: 2020-12-14

## 2020-12-13 RX ORDER — HEPARIN SODIUM 5000 [USP'U]/ML
5000 INJECTION, SOLUTION INTRAVENOUS; SUBCUTANEOUS EVERY 8 HOURS
Status: DISCONTINUED | OUTPATIENT
Start: 2020-12-13 | End: 2020-12-15 | Stop reason: HOSPADM

## 2020-12-13 RX ORDER — AZITHROMYCIN 250 MG/1
500 TABLET, FILM COATED ORAL DAILY
Status: DISCONTINUED | OUTPATIENT
Start: 2020-12-14 | End: 2020-12-13

## 2020-12-13 RX ORDER — CHLORHEXIDINE GLUCONATE ORAL RINSE 1.2 MG/ML
15 SOLUTION DENTAL 2 TIMES DAILY
Status: DISCONTINUED | OUTPATIENT
Start: 2020-12-13 | End: 2020-12-15 | Stop reason: HOSPADM

## 2020-12-13 RX ADMIN — PROPOFOL 40 MCG/KG/MIN: 10 INJECTION, EMULSION INTRAVENOUS at 11:12

## 2020-12-13 RX ADMIN — PROPOFOL 5 MCG/KG/MIN: 10 INJECTION, EMULSION INTRAVENOUS at 04:12

## 2020-12-13 RX ADMIN — CHLORHEXIDINE GLUCONATE 0.12% ORAL RINSE 15 ML: 1.2 LIQUID ORAL at 09:12

## 2020-12-13 RX ADMIN — HEPARIN SODIUM 5000 UNITS: 5000 INJECTION INTRAVENOUS; SUBCUTANEOUS at 06:12

## 2020-12-13 RX ADMIN — FAMOTIDINE 20 MG: 10 INJECTION INTRAVENOUS at 09:12

## 2020-12-13 RX ADMIN — AZITHROMYCIN MONOHYDRATE 500 MG: 500 INJECTION, POWDER, LYOPHILIZED, FOR SOLUTION INTRAVENOUS at 06:12

## 2020-12-13 RX ADMIN — CEFTRIAXONE 2 G: 2 INJECTION, SOLUTION INTRAVENOUS at 06:12

## 2020-12-13 RX ADMIN — PROPOFOL 40 MCG/KG/MIN: 10 INJECTION, EMULSION INTRAVENOUS at 07:12

## 2020-12-13 RX ADMIN — MUPIROCIN: 20 OINTMENT TOPICAL at 09:12

## 2020-12-13 NOTE — ASSESSMENT & PLAN NOTE
Patient transferred from [place] with concerns for cardiac arrest.  Patient was temporarily unresponsive at outside facility.  However, there was no documentation reporting that patient was coded.  And patient is moving all extremities and following commands upon arrival at [facility]. Troponins at outside facility are flat and less than 0.01.  No EKG changes were noted at the outside facility during this time.  Patient does not have a history of heart disease or stroke.  CT head at outside facility showed no acute ischemic changes.  Patient was intubated at outside facility for airway watch and remains intubated here.  Initially upon transfer patient was not adequately sedated and expressed to nursing staff that he was having chest pain.  As patient was intubated he was not able to appropriately describe the pain.     Differential includes PE, ACS, pericarditis, pleuritic chest pain secondary to found left lower no pneumonia and possible aspiration pneumonia,    PLAN:  -- obtain echocardiogram.  No outside records of previous echo for comparison.  -- obtain stat EKG and keep patient on telemetry to assess for underlying arrhythmias  -- trend troponins  -- low threshold fto start ACS should patient have EKG changes and/or increase in troponins in the setting of chest pain.  -- obtain D-dimer to rule out PE.  If D-dimer is positive will obtain CTA  -- treat patient's underlying pneumonia with cap coverage (CTX and azithromycin)  -- Up to date risk stratification : TSH, Lipids, HbA1c with optimization of risk factors is necessary  -- Obtain BNP

## 2020-12-13 NOTE — ASSESSMENT & PLAN NOTE
Outside chest x-ray was suggestive for left lower lobe pneumonia and/or possible aspiration pneumonia patient has a leukocytosis of 11,000 with an elevated lactic acidosis at 3.5 in the outside facility.  No recent hospitalizations or sick contacts that were noted.      PLAN:  -- will treat for CAP coverage with azithromycin and ceftriaxone. May add on flagyl if patient does not improve in the next 24 hours  -- obtain sputum and blood cultures  -- patient was COVID Negative at outside facility  -- repeat chest x-ray  -- trend lactic levels. Patient may require IVF

## 2020-12-13 NOTE — HPI
Mr. Aguero 60-year-old male with history of hypertension. He is being transferred from [place] after suspected cardiac arrest. Records from OSH show that patient was walking to his tractor when he apparently felt short of breath with chest discomfort and went to sit down and called for his wife. When she drove up to check on him she found him sitting in the  seat minimally responsive answering questions in only 1 or 2 words. EMS was called and when he reached the ED he was unresponsive to all stimuli. However, it is not clear per the records that the patient arrested or that he was coded. He does not have any known history of heart disease or stroke. Has had no head trauma. He was given Narcan in route without any significant changes. Patient was also hypotensive and bradycardic and was given atropine that helped his pulse slightly. SBP was in low 90s upon arriving to the ED. In the ED, patient was given an additional dose of Narcan 1 mg without any change. Remained unresponsive was concern for airway protection and was intubated. Patient remained borderline on his blood pressure and was given IV fluids. Central line was placed in ED for fluids and Levophed administration. Patient's had blood cultures drawn and was started Maxipime at OSH. Because of the chest pain and altered mental status it was presumed that the patient had a cardiac arrhythmia/cardiac event and was elected to transfer to [facility]. Before transfer sedation changed from a propofol to Versed. It was reported that the patient was more alert and required sedation to keep him from pulling out lines and tubes.    Labs at OSH: Covid negative, ammonia level is normal at 45, urine drug screenIs negative, cath UA shows 2-5 WBCs  RBCs few bacteria. ABGs pH is 7.37 PCO2 of 46 PO2 64, WBC 11,800, hemoglobin 14.2 and hematocrit of 42.8. BMP showed a glucose 150 otherwise normal CPKs 97 CK-MB 1.76 troponin less than 0.01 lactic acid 3. CT head  showed no acute intracranial process. Chest x-ray was pertinent for an enlarged heart with possible left lower lobe pneumonia.EKG  sinus rate is 61 left axis deviation. Repeat lactate has increased to 3.5. After intubation, Patient's repeat ABGs on 100% FiO2 showed pH is 7.25 PCO2 of 58 and PO2 of 57 and his rate was increased to account for hypercapnia    Upon Arrival to [facility] he is following commands appropriately. He is hemodynamically stable without arrhythmia present on tele. He was hypertensive with 154/76 on versed with a HR of 100.

## 2020-12-13 NOTE — ASSESSMENT & PLAN NOTE
Unclear if patient is an active drinker.  With no known history of liver failure. However, outside facility ammonia level was at 45.  Tox screen at outside facility was negative    PLAN:  -- Obtain CMP to assess for elevated LFTs.  If high, low threshold for hepatitis panel  -- obtain salicylates, Tylenol, ethanol level  -- limited right upper quadrant abdominal ultrasound

## 2020-12-13 NOTE — ASSESSMENT & PLAN NOTE
Patient takes amlodipine 5 mg.  Upon arrival to [facility] [place] blood pressure was when the 150s/80s while on Versed.  However previous to that at the outside facility there were reports that the patient was requiring Levophed.    PLAN:  -- will place amlodipine 5 mg on the patient's Mar should he not require pressors while mechanically ventilated.    -- goal blood pressure <130/80

## 2020-12-13 NOTE — H&P
[facility]  Cardiology  History and Physical     Patient Name: Korey Aguero  MRN: [id number]  Admission Date: 12/13/2020  Code Status: Full Code   Attending Provider: Ab Valdez MD   Primary Care Physician: Nader Valiente MD  Principal Problem:<principal problem not specified>    Patient information was obtained from patient, past medical records and ER records.     Subjective:     Chief Complaint:  Chest pain     HPI:  Mr. Aguero 60-year-old male with history of hypertension. He is being transferred from [place] after suspected cardiac arrest. Records from OSH show that patient was walking to his tractor when he apparently felt short of breath with chest discomfort and went to sit down and called for his wife. When she drove up to check on him she found him sitting in the  seat minimally responsive answering questions in only 1 or 2 words. EMS was called and when he reached the ED he was unresponsive to all stimuli. However, it is not clear per the records that the patient arrested or that he was coded. He does not have any known history of heart disease or stroke. Has had no head trauma. He was given Narcan in route without any significant changes. Patient was also hypotensive and bradycardic and was given atropine that helped his pulse slightly. SBP was in low 90s upon arriving to the ED. In the ED, patient was given an additional dose of Narcan 1 mg without any change. Remained unresponsive was concern for airway protection and was intubated. Patient remained borderline on his blood pressure and was given IV fluids. Central line was placed in ED for fluids and Levophed administration. Patient's had blood cultures drawn and was started Maxipime at OSH. Because of the chest pain and altered mental status it was presumed that the patient had a cardiac arrhythmia/cardiac event and was elected to transfer to [facility]. Before transfer sedation changed from a propofol to Versed.  It was reported that the patient was more alert and required sedation to keep him from pulling out lines and tubes.    Labs at OSH: Covid negative, ammonia level is normal at 45, urine drug screenIs negative, cath UA shows 2-5 WBCs  RBCs few bacteria. ABGs pH is 7.37 PCO2 of 46 PO2 64, WBC 11,800, hemoglobin 14.2 and hematocrit of 42.8. BMP showed a glucose 150 otherwise normal CPKs 97 CK-MB 1.76 troponin less than 0.01 lactic acid 3. CT head showed no acute intracranial process. Chest x-ray was pertinent for an enlarged heart with possible left lower lobe pneumonia.EKG  sinus rate is 61 left axis deviation. Repeat lactate has increased to 3.5. After intubation, Patient's repeat ABGs on 100% FiO2 showed pH is 7.25 PCO2 of 58 and PO2 of 57 and his rate was increased to account for hypercapnia    Upon Arrival to [facility] he is following commands appropriately. He is hemodynamically stable without arrhythmia present on tele. He was hypertensive with 154/76 on versed with a HR of 100.     No past medical history on file.    No past surgical history on file.    Review of patient's allergies indicates:   Allergen Reactions    Adhesive      Band-aide    Pcn [penicillins]        Current Facility-Administered Medications on File Prior to Encounter   Medication    [DISCONTINUED] 0.9%  NaCl infusion    [DISCONTINUED] GENERIC EXTERNAL MEDICATION     Current Outpatient Medications on File Prior to Encounter   Medication Sig    amLODIPine (NORVASC) 5 MG tablet Take 1 tablet (5 mg total) by mouth once daily.    apple cider vinegar 300 mg Tab Take by mouth.    ketoconazole (NIZORAL) 2 % cream Apply topically once daily.    krill-omega-3-dha-epa-lipids (KRILL OIL) 961-57-72-50 mg Cap Take by mouth.    UNABLE TO FIND Take 2 tablets by mouth once daily. Neno Ford     Family History     None        Tobacco Use    Smoking status: Never Smoker   Substance and Sexual Activity    Alcohol use: Not on file    Drug use:  Not on file    Sexual activity: Not on file     Review of Systems   Unable to perform ROS: intubated     Objective:     Vital Signs (Most Recent):  Temp: 98.6 °F (37 °C) (12/13/20 1645)  Pulse: 101 (12/13/20 1645)  Resp: (!) 22 (12/13/20 1645)  BP: (!) 155/96 (12/13/20 1645)  SpO2: 98 % (12/13/20 1645) Vital Signs (24h Range):  Temp:  [93.4 °F (34.1 °C)-98.6 °F (37 °C)] 98.6 °F (37 °C)  Pulse:  [] 101  Resp:  [17-32] 22  SpO2:  [98 %-100 %] 98 %  BP: (121-161)/(61-96) 155/96        There is no height or weight on file to calculate BMI.    SpO2: 98 %  O2 Device (Oxygen Therapy): ventilator    No intake or output data in the 24 hours ending 12/13/20 1709    Lines/Drains/Airways     None                 Physical Exam   Constitutional: He appears well-developed and well-nourished. No distress.   HENT:   Head: Normocephalic and atraumatic.   Eyes: EOM are normal.   Neck: Normal range of motion. Neck supple. No JVD present.   Cardiovascular: Normal rate, regular rhythm and intact distal pulses.   Pulmonary/Chest: He has rales (L>R).   On Mechanical Ventillation   Abdominal: Soft. Bowel sounds are normal. He exhibits no distension.   Musculoskeletal:         General: No edema.   Neurological:   sedated   Skin: Skin is warm and dry. He is not diaphoretic. No erythema.       Significant Labs:   ABG:   Recent Labs   Lab 12/13/20  1640   PH 7.351   PCO2 45.7*   HCO3 25.3   POCSATURATED 98   BE 0   CMP   Recent Labs   Lab 12/13/20  1620      K 4.2      CO2 20*   *   BUN 7   CREATININE 0.7   CALCIUM 8.0*   PROT 6.7   ALBUMIN 3.7   BILITOT 0.4   ALKPHOS 75   AST 22   ALT 28   ANIONGAP 14   ESTGFRAFRICA >60.0   EGFRNONAA >60.0   , CBC   Recent Labs   Lab 12/13/20  1620   WBC 16.00*   HGB 13.8*   HCT 42.9      Troponin   Recent Labs   Lab 12/13/20  1620   TROPONINI <0.006  <0.006    and All pertinent lab results from the last 24 hours have been reviewed.    Significant Imaging: All imaging has been  reveiwed in the last 24 hours    Assessment and Plan:     Essential hypertension  Patient takes amlodipine 5 mg.  Upon arrival to [facility] blood pressure was when the 150s/80s while on Versed.  However previous to that at the outside facility there were reports that the patient was requiring Levophed.    PLAN:  -- will place amlodipine 5 mg on the patient's Mar should he not require pressors while mechanically ventilated.    -- goal blood pressure <130/80    Serum ammonia increased  Unclear if patient is an active drinker.  With no known history of liver failure. However, outside facility ammonia level was at 45.  Tox screen at outside facility was negative    PLAN:  -- Obtain CMP to assess for elevated LFTs.  If high, low threshold for hepatitis panel  -- obtain salicylates, Tylenol, ethanol level  -- limited right upper quadrant abdominal ultrasound     On mechanically assisted ventilation  Patient was intubated secondary to being briefly unresponsive at outside facility in order for airway protection.  Will obtain repeat ABG and titrate vent appropriately.    Vent Mode: A/C  Oxygen Concentration (%):  [40] 40  Resp Rate Total:  [18 br/min] 18 br/min  Vt Set:  [500 mL] 500 mL  PEEP/CPAP:  [5 cmH20] 5 cmH20  Mean Airway Pressure:  [9.1 cmH20] 9.1 cmH20     PLAN:  -- SAT/SBT daily  -- ABG PRN for vent tirtration    Sepsis  In the setting of left lower lobe pneumonia as seen in the outside facility.     PLAN:  -- Empiric coverage with Azithromycin and CTX  -- Blood, urine, and sputum cultures  -- trend lactic acidosis  -- patient may need IVF  -- repeat CTX    Left lower lobe pneumonia  Outside chest x-ray was suggestive for left lower lobe pneumonia and/or possible aspiration pneumonia patient has a leukocytosis of 11,000 with an elevated lactic acidosis at 3.5 in the outside facility.  No recent hospitalizations or sick contacts that were noted.      PLAN:  -- will treat for CAP coverage with azithromycin and  ceftriaxone. May add on flagyl if patient does not improve in the next 24 hours  -- obtain sputum and blood cultures  -- patient was COVID Negative at outside facility  -- repeat chest x-ray  -- trend lactic levels. Patient may require IVF       Chest pain  Patient transferred from [place] with concerns for cardiac arrest.  Patient was temporarily unresponsive at outside facility.  However, there was no documentation reporting that patient was coded.  And patient is moving all extremities and following commands upon arrival at [facility]. Troponins at outside facility are flat and less than 0.01.  No EKG changes were noted at the outside facility during this time.  Patient does not have a history of heart disease or stroke.  CT head at outside facility showed no acute ischemic changes.  Patient was intubated at outside facility for airway watch and remains intubated here.  Initially upon transfer patient was not adequately sedated and expressed to nursing staff that he was having chest pain.  As patient was intubated he was not able to appropriately describe the pain.     Differential includes PE, ACS, pericarditis, pleuritic chest pain secondary to found left lower no pneumonia and possible aspiration pneumonia,    PLAN:  -- obtain echocardiogram.  No outside records of previous echo for comparison.  -- obtain stat EKG and keep patient on telemetry to assess for underlying arrhythmias  -- trend troponins  -- low threshold fto start ACS should patient have EKG changes and/or increase in troponins in the setting of chest pain.  -- obtain D-dimer to rule out PE.  If D-dimer is positive will obtain CTA  -- treat patient's underlying pneumonia with cap coverage (CTX and azithromycin)  -- Up to date risk stratification : TSH, Lipids, HbA1c with optimization of risk factors is necessary  -- Obtain BNP        VTE Risk Mitigation (From admission, onward)         Ordered     IP VTE HIGH RISK PATIENT  Once      12/13/20 6774      Place sequential compression device  Until discontinued      12/13/20 Ayden7                Trini Eaton DO  Cardiology   [facility]

## 2020-12-13 NOTE — SUBJECTIVE & OBJECTIVE
No past medical history on file.    No past surgical history on file.    Review of patient's allergies indicates:   Allergen Reactions    Adhesive      Band-aide    Pcn [penicillins]        Current Facility-Administered Medications on File Prior to Encounter   Medication    [DISCONTINUED] 0.9%  NaCl infusion    [DISCONTINUED] GENERIC EXTERNAL MEDICATION     Current Outpatient Medications on File Prior to Encounter   Medication Sig    amLODIPine (NORVASC) 5 MG tablet Take 1 tablet (5 mg total) by mouth once daily.    apple cider vinegar 300 mg Tab Take by mouth.    ketoconazole (NIZORAL) 2 % cream Apply topically once daily.    krill-omega-3-dha-epa-lipids (KRILL OIL) 543-77-45-50 mg Cap Take by mouth.    UNABLE TO FIND Take 2 tablets by mouth once daily. Neno Riosadrián     Family History     None        Tobacco Use    Smoking status: Never Smoker   Substance and Sexual Activity    Alcohol use: Not on file    Drug use: Not on file    Sexual activity: Not on file     Review of Systems   Unable to perform ROS: intubated     Objective:     Vital Signs (Most Recent):  Temp: 98.6 °F (37 °C) (12/13/20 1645)  Pulse: 101 (12/13/20 1645)  Resp: (!) 22 (12/13/20 1645)  BP: (!) 155/96 (12/13/20 1645)  SpO2: 98 % (12/13/20 1645) Vital Signs (24h Range):  Temp:  [93.4 °F (34.1 °C)-98.6 °F (37 °C)] 98.6 °F (37 °C)  Pulse:  [] 101  Resp:  [17-32] 22  SpO2:  [98 %-100 %] 98 %  BP: (121-161)/(61-96) 155/96        There is no height or weight on file to calculate BMI.    SpO2: 98 %  O2 Device (Oxygen Therapy): ventilator    No intake or output data in the 24 hours ending 12/13/20 1709    Lines/Drains/Airways     None                 Physical Exam   Constitutional: He appears well-developed and well-nourished. No distress.   HENT:   Head: Normocephalic and atraumatic.   Eyes: EOM are normal.   Neck: Normal range of motion. Neck supple. No JVD present.   Cardiovascular: Normal rate, regular rhythm and intact distal  pulses.   Pulmonary/Chest: He has rales (L>R).   On Mechanical Ventillation   Abdominal: Soft. Bowel sounds are normal. He exhibits no distension.   Musculoskeletal:         General: No edema.   Neurological:   sedated   Skin: Skin is warm and dry. He is not diaphoretic. No erythema.       Significant Labs:   ABG:   Recent Labs   Lab 12/13/20  1640   PH 7.351   PCO2 45.7*   HCO3 25.3   POCSATURATED 98   BE 0   CMP   Recent Labs   Lab 12/13/20  1620      K 4.2      CO2 20*   *   BUN 7   CREATININE 0.7   CALCIUM 8.0*   PROT 6.7   ALBUMIN 3.7   BILITOT 0.4   ALKPHOS 75   AST 22   ALT 28   ANIONGAP 14   ESTGFRAFRICA >60.0   EGFRNONAA >60.0   , CBC   Recent Labs   Lab 12/13/20  1620   WBC 16.00*   HGB 13.8*   HCT 42.9      Troponin   Recent Labs   Lab 12/13/20  1620   TROPONINI <0.006  <0.006    and All pertinent lab results from the last 24 hours have been reviewed.    Significant Imaging: All imaging has been reveiwed in the last 24 hours

## 2020-12-13 NOTE — ASSESSMENT & PLAN NOTE
Patient was intubated secondary to being briefly unresponsive at outside facility in order for airway protection.  Will obtain repeat ABG and titrate vent appropriately.    Vent Mode: A/C  Oxygen Concentration (%):  [40] 40  Resp Rate Total:  [18 br/min] 18 br/min  Vt Set:  [500 mL] 500 mL  PEEP/CPAP:  [5 cmH20] 5 cmH20  Mean Airway Pressure:  [9.1 cmH20] 9.1 cmH20     PLAN:  -- SAT/SBT daily  -- ABG PRN for vent tirtration

## 2020-12-14 PROBLEM — J96.02 ACUTE RESPIRATORY FAILURE WITH HYPOXIA AND HYPERCAPNIA: Status: ACTIVE | Noted: 2020-12-14

## 2020-12-14 PROBLEM — J96.01 ACUTE RESPIRATORY FAILURE WITH HYPOXIA AND HYPERCAPNIA: Status: ACTIVE | Noted: 2020-12-14

## 2020-12-14 PROBLEM — J96.01 ACUTE HYPOXEMIC RESPIRATORY FAILURE: Status: RESOLVED | Noted: 2020-12-13 | Resolved: 2020-12-14

## 2020-12-14 LAB
ALBUMIN SERPL BCP-MCNC: 3.2 G/DL (ref 3.5–5.2)
ALLENS TEST: ABNORMAL
ALP SERPL-CCNC: 70 U/L (ref 55–135)
ALT SERPL W/O P-5'-P-CCNC: 21 U/L (ref 10–44)
ANION GAP SERPL CALC-SCNC: 13 MMOL/L (ref 8–16)
AST SERPL-CCNC: 20 U/L (ref 10–40)
AV INDEX (PROSTH): 1.07
AV MEAN GRADIENT: 3 MMHG
AV PEAK GRADIENT: 6 MMHG
AV VALVE AREA: 4 CM2
AV VELOCITY RATIO: 0.91
BASOPHILS # BLD AUTO: 0.02 K/UL (ref 0–0.2)
BASOPHILS NFR BLD: 0.2 % (ref 0–1.9)
BILIRUB SERPL-MCNC: 0.4 MG/DL (ref 0.1–1)
BSA FOR ECHO PROCEDURE: 2.34 M2
BUN SERPL-MCNC: 8 MG/DL (ref 6–20)
CALCIUM SERPL-MCNC: 8 MG/DL (ref 8.7–10.5)
CHLORIDE SERPL-SCNC: 104 MMOL/L (ref 95–110)
CO2 SERPL-SCNC: 21 MMOL/L (ref 23–29)
CREAT SERPL-MCNC: 0.8 MG/DL (ref 0.5–1.4)
CV ECHO LV RWT: 0.31 CM
DELSYS: ABNORMAL
DIFFERENTIAL METHOD: ABNORMAL
DOP CALC AO PEAK VEL: 1.2 M/S
DOP CALC AO VTI: 17.87 CM
DOP CALC LVOT AREA: 3.7 CM2
DOP CALC LVOT DIAMETER: 2.18 CM
DOP CALC LVOT PEAK VEL: 1.09 M/S
DOP CALC LVOT STROKE VOLUME: 71.52 CM3
DOP CALCLVOT PEAK VEL VTI: 19.17 CM
E WAVE DECELERATION TIME: 233.47 MSEC
E/A RATIO: 0.63
E/E' RATIO: 13.11 M/S
ECHO LV POSTERIOR WALL: 0.74 CM (ref 0.6–1.1)
EOSINOPHIL # BLD AUTO: 0 K/UL (ref 0–0.5)
EOSINOPHIL NFR BLD: 0.3 % (ref 0–8)
ERYTHROCYTE [DISTWIDTH] IN BLOOD BY AUTOMATED COUNT: 13.8 % (ref 11.5–14.5)
ERYTHROCYTE [SEDIMENTATION RATE] IN BLOOD BY WESTERGREN METHOD: 16 MM/H
EST. GFR  (AFRICAN AMERICAN): >60 ML/MIN/1.73 M^2
EST. GFR  (NON AFRICAN AMERICAN): >60 ML/MIN/1.73 M^2
FIO2: 40
FRACTIONAL SHORTENING: 34 % (ref 28–44)
GLUCOSE SERPL-MCNC: 101 MG/DL (ref 70–110)
HCO3 UR-SCNC: 26.1 MMOL/L (ref 24–28)
HCT VFR BLD AUTO: 39.1 % (ref 40–54)
HGB BLD-MCNC: 12.8 G/DL (ref 14–18)
IMM GRANULOCYTES # BLD AUTO: 0.04 K/UL (ref 0–0.04)
IMM GRANULOCYTES NFR BLD AUTO: 0.3 % (ref 0–0.5)
INTERVENTRICULAR SEPTUM: 1.4 CM (ref 0.6–1.1)
LA MAJOR: 4.97 CM
LA MINOR: 5.54 CM
LA WIDTH: 3.03 CM
LACTATE SERPL-SCNC: 1.3 MMOL/L (ref 0.5–2.2)
LACTATE SERPL-SCNC: 1.5 MMOL/L (ref 0.5–2.2)
LEFT ATRIUM SIZE: 3.42 CM
LEFT ATRIUM VOLUME INDEX MOD: 16.1 ML/M2
LEFT ATRIUM VOLUME INDEX: 20.4 ML/M2
LEFT ATRIUM VOLUME MOD: 36.45 CM3
LEFT ATRIUM VOLUME: 46.15 CM3
LEFT INTERNAL DIMENSION IN SYSTOLE: 3.18 CM (ref 2.1–4)
LEFT VENTRICLE DIASTOLIC VOLUME INDEX: 47.51 ML/M2
LEFT VENTRICLE DIASTOLIC VOLUME: 107.73 ML
LEFT VENTRICLE MASS INDEX: 82 G/M2
LEFT VENTRICLE SYSTOLIC VOLUME INDEX: 17.7 ML/M2
LEFT VENTRICLE SYSTOLIC VOLUME: 40.19 ML
LEFT VENTRICULAR INTERNAL DIMENSION IN DIASTOLE: 4.8 CM (ref 3.5–6)
LEFT VENTRICULAR MASS: 186.69 G
LV LATERAL E/E' RATIO: 11.8 M/S
LV SEPTAL E/E' RATIO: 14.75 M/S
LYMPHOCYTES # BLD AUTO: 2.7 K/UL (ref 1–4.8)
LYMPHOCYTES NFR BLD: 22 % (ref 18–48)
MAGNESIUM SERPL-MCNC: 1.7 MG/DL (ref 1.6–2.6)
MCH RBC QN AUTO: 30 PG (ref 27–31)
MCHC RBC AUTO-ENTMCNC: 32.7 G/DL (ref 32–36)
MCV RBC AUTO: 92 FL (ref 82–98)
MODE: ABNORMAL
MONOCYTES # BLD AUTO: 0.9 K/UL (ref 0.3–1)
MONOCYTES NFR BLD: 7.5 % (ref 4–15)
MV PEAK A VEL: 0.94 M/S
MV PEAK E VEL: 0.59 M/S
NEUTROPHILS # BLD AUTO: 8.6 K/UL (ref 1.8–7.7)
NEUTROPHILS NFR BLD: 69.7 % (ref 38–73)
NRBC BLD-RTO: 0 /100 WBC
PCO2 BLDA: 37 MMHG (ref 35–45)
PEEP: 5
PH SMN: 7.46 [PH] (ref 7.35–7.45)
PHOSPHATE SERPL-MCNC: 3 MG/DL (ref 2.7–4.5)
PLATELET # BLD AUTO: 235 K/UL (ref 150–350)
PMV BLD AUTO: 10.6 FL (ref 9.2–12.9)
PO2 BLDA: 77 MMHG (ref 80–100)
POC BE: 2 MMOL/L
POC SATURATED O2: 96 % (ref 95–100)
POC TCO2: 27 MMOL/L (ref 23–27)
POCT GLUCOSE: 100 MG/DL (ref 70–110)
POCT GLUCOSE: 101 MG/DL (ref 70–110)
POTASSIUM SERPL-SCNC: 3.5 MMOL/L (ref 3.5–5.1)
PROT SERPL-MCNC: 6 G/DL (ref 6–8.4)
RA MAJOR: 4.05 CM
RA WIDTH: 3.17 CM
RBC # BLD AUTO: 4.26 M/UL (ref 4.6–6.2)
RV TISSUE DOPPLER FREE WALL SYSTOLIC VELOCITY 1 (APICAL 4 CHAMBER VIEW): 13.07 CM/S
SAMPLE: ABNORMAL
SINUS: 3.59 CM
SITE: ABNORMAL
SODIUM SERPL-SCNC: 138 MMOL/L (ref 136–145)
SP02: 100
STJ: 3.38 CM
TDI LATERAL: 0.05 M/S
TDI SEPTAL: 0.04 M/S
TDI: 0.05 M/S
TRICUSPID ANNULAR PLANE SYSTOLIC EXCURSION: 2.33 CM
TROPONIN I SERPL DL<=0.01 NG/ML-MCNC: 0.01 NG/ML (ref 0–0.03)
VT: 500
WBC # BLD AUTO: 12.32 K/UL (ref 3.9–12.7)

## 2020-12-14 PROCEDURE — 83735 ASSAY OF MAGNESIUM: CPT

## 2020-12-14 PROCEDURE — 36600 WITHDRAWAL OF ARTERIAL BLOOD: CPT

## 2020-12-14 PROCEDURE — 83605 ASSAY OF LACTIC ACID: CPT | Mod: 91

## 2020-12-14 PROCEDURE — 99900035 HC TECH TIME PER 15 MIN (STAT)

## 2020-12-14 PROCEDURE — 27000221 HC OXYGEN, UP TO 24 HOURS

## 2020-12-14 PROCEDURE — 82803 BLOOD GASES ANY COMBINATION: CPT

## 2020-12-14 PROCEDURE — 99233 PR SUBSEQUENT HOSPITAL CARE,LEVL III: ICD-10-PCS | Mod: ,,, | Performed by: INTERNAL MEDICINE

## 2020-12-14 PROCEDURE — 25000003 PHARM REV CODE 250: Performed by: INTERNAL MEDICINE

## 2020-12-14 PROCEDURE — 94761 N-INVAS EAR/PLS OXIMETRY MLT: CPT

## 2020-12-14 PROCEDURE — 99900026 HC AIRWAY MAINTENANCE (STAT)

## 2020-12-14 PROCEDURE — 20000000 HC ICU ROOM

## 2020-12-14 PROCEDURE — 94150 VITAL CAPACITY TEST: CPT

## 2020-12-14 PROCEDURE — 63600175 PHARM REV CODE 636 W HCPCS: Performed by: STUDENT IN AN ORGANIZED HEALTH CARE EDUCATION/TRAINING PROGRAM

## 2020-12-14 PROCEDURE — 93010 EKG 12-LEAD: ICD-10-PCS | Mod: ,,, | Performed by: INTERNAL MEDICINE

## 2020-12-14 PROCEDURE — 83605 ASSAY OF LACTIC ACID: CPT

## 2020-12-14 PROCEDURE — 94010 BREATHING CAPACITY TEST: CPT

## 2020-12-14 PROCEDURE — 80053 COMPREHEN METABOLIC PANEL: CPT

## 2020-12-14 PROCEDURE — 84484 ASSAY OF TROPONIN QUANT: CPT

## 2020-12-14 PROCEDURE — 84484 ASSAY OF TROPONIN QUANT: CPT | Mod: 91

## 2020-12-14 PROCEDURE — 84100 ASSAY OF PHOSPHORUS: CPT

## 2020-12-14 PROCEDURE — 93005 ELECTROCARDIOGRAM TRACING: CPT

## 2020-12-14 PROCEDURE — 93010 ELECTROCARDIOGRAM REPORT: CPT | Mod: ,,, | Performed by: INTERNAL MEDICINE

## 2020-12-14 PROCEDURE — 99233 SBSQ HOSP IP/OBS HIGH 50: CPT | Mod: ,,, | Performed by: INTERNAL MEDICINE

## 2020-12-14 PROCEDURE — 85025 COMPLETE CBC W/AUTO DIFF WBC: CPT

## 2020-12-14 PROCEDURE — 25000003 PHARM REV CODE 250: Performed by: STUDENT IN AN ORGANIZED HEALTH CARE EDUCATION/TRAINING PROGRAM

## 2020-12-14 RX ORDER — HYDRALAZINE HYDROCHLORIDE 50 MG/1
50 TABLET, FILM COATED ORAL EVERY 6 HOURS PRN
Status: DISCONTINUED | OUTPATIENT
Start: 2020-12-14 | End: 2020-12-15 | Stop reason: HOSPADM

## 2020-12-14 RX ORDER — AMLODIPINE BESYLATE 5 MG/1
5 TABLET ORAL ONCE
Status: COMPLETED | OUTPATIENT
Start: 2020-12-14 | End: 2020-12-14

## 2020-12-14 RX ORDER — MAGNESIUM SULFATE HEPTAHYDRATE 40 MG/ML
2 INJECTION, SOLUTION INTRAVENOUS ONCE
Status: COMPLETED | OUTPATIENT
Start: 2020-12-14 | End: 2020-12-14

## 2020-12-14 RX ORDER — ACETAMINOPHEN 325 MG/1
650 TABLET ORAL EVERY 4 HOURS PRN
Status: DISCONTINUED | OUTPATIENT
Start: 2020-12-14 | End: 2020-12-15 | Stop reason: HOSPADM

## 2020-12-14 RX ORDER — AMLODIPINE BESYLATE 10 MG/1
10 TABLET ORAL DAILY
Status: DISCONTINUED | OUTPATIENT
Start: 2020-12-15 | End: 2020-12-15 | Stop reason: HOSPADM

## 2020-12-14 RX ADMIN — AMLODIPINE BESYLATE 5 MG: 5 TABLET ORAL at 06:12

## 2020-12-14 RX ADMIN — FAMOTIDINE 20 MG: 10 INJECTION INTRAVENOUS at 09:12

## 2020-12-14 RX ADMIN — MUPIROCIN: 20 OINTMENT TOPICAL at 09:12

## 2020-12-14 RX ADMIN — HEPARIN SODIUM 5000 UNITS: 5000 INJECTION INTRAVENOUS; SUBCUTANEOUS at 09:12

## 2020-12-14 RX ADMIN — CHLORHEXIDINE GLUCONATE 0.12% ORAL RINSE 15 ML: 1.2 LIQUID ORAL at 09:12

## 2020-12-14 RX ADMIN — PROPOFOL 40 MCG/KG/MIN: 10 INJECTION, EMULSION INTRAVENOUS at 03:12

## 2020-12-14 RX ADMIN — CEFTRIAXONE 2 G: 2 INJECTION, SOLUTION INTRAVENOUS at 04:12

## 2020-12-14 RX ADMIN — AZITHROMYCIN MONOHYDRATE 500 MG: 500 INJECTION, POWDER, LYOPHILIZED, FOR SOLUTION INTRAVENOUS at 04:12

## 2020-12-14 RX ADMIN — PROPOFOL 35 MCG/KG/MIN: 10 INJECTION, EMULSION INTRAVENOUS at 08:12

## 2020-12-14 RX ADMIN — AMLODIPINE BESYLATE 5 MG: 5 TABLET ORAL at 09:12

## 2020-12-14 RX ADMIN — MAGNESIUM SULFATE 2 G: 2 INJECTION INTRAVENOUS at 06:12

## 2020-12-14 RX ADMIN — HEPARIN SODIUM 5000 UNITS: 5000 INJECTION INTRAVENOUS; SUBCUTANEOUS at 05:12

## 2020-12-14 RX ADMIN — ACETAMINOPHEN 650 MG: 325 TABLET ORAL at 11:12

## 2020-12-14 RX ADMIN — HEPARIN SODIUM 5000 UNITS: 5000 INJECTION INTRAVENOUS; SUBCUTANEOUS at 03:12

## 2020-12-14 RX ADMIN — POTASSIUM BICARBONATE 20 MEQ: 391 TABLET, EFFERVESCENT ORAL at 06:12

## 2020-12-14 NOTE — ASSESSMENT & PLAN NOTE
Patient transferred from [place] with concerns for cardiac arrest.  Patient was temporarily unresponsive at outside facility.  However, there was no documentation reporting that patient was coded. Patient was moving all extremities and following commands upon arrival at [facility]. Troponins at outside facility are flat and less than 0.01.  No EKG changes were noted at the outside facility during this time.  Patient does not have a history of heart disease or stroke.  CT head at outside facility showed no acute ischemic changes.  Patient was intubated at outside facility for airway watch and remains intubated here.  Initially upon transfer patient was not adequately sedated and expressed to nursing staff that he was having chest pain.  As patient was intubated he was not able to appropriately describe the pain.     Differential includes PE, ACS, pericarditis, pleuritic chest pain secondary to found pneumonia and possible aspiration pneumonia    Troponins flat, D-dimer only minimally elevated    PLAN:  -- F/U echocardiogram.  No outside records of previous echo for comparison.  -- Continue  Daily EKG and keep patient on telemetry to assess for underlying arrhythmias  -- treat patient's underlying pneumonia with cap coverage (CTX and azithromycin)  -- patient will likely need stress test and ischaemic work up. Will monitor closely to See if this needs to be done inpatient vs OP

## 2020-12-14 NOTE — PROGRESS NOTES
[facility]  Cardiology  Progress Note    Patient Name: Korey Aguero  MRN: [id number]  Admission Date: 12/13/2020  Hospital Length of Stay: 1 days  Code Status: Full Code   Attending Physician: Ab Valdez MD   Primary Care Physician: Nader Valiente MD  Expected Discharge Date:   Principal Problem:<principal problem not specified>    Subjective:     Hospital Course:   60-year-old male transferred for suspected cardiac arrest after being found unresponsive upon admittance to the ED. Troponin negative EKG at outside facility showed no acute abnormalities. D-dimer negative, patient was intubated for airway protection.  CT head negative for acute abnormality the outside facility.  Patient appears to be septic secondary to community-acquired pneumonia.     Interval History: Minimal vent settings. Patient does not appear to have ACS or PE. Will attempt to extubate    Review of Systems   Unable to perform ROS: intubated     Objective:     Vital Signs (Most Recent):  Temp: 98.1 °F (36.7 °C) (12/14/20 0302)  Pulse: 64 (12/14/20 0502)  Resp: 16 (12/14/20 0502)  BP: 126/72 (12/14/20 0502)  SpO2: 100 % (12/14/20 0502) Vital Signs (24h Range):  Temp:  [93.4 °F (34.1 °C)-98.6 °F (37 °C)] 98.1 °F (36.7 °C)  Pulse:  [] 64  Resp:  [16-32] 16  SpO2:  [98 %-100 %] 100 %  BP: (104-161)/(61-96) 126/72     Weight: 112 kg (246 lb 14.4 oz)  Body mass index is 35.94 kg/m².     SpO2: 100 %  O2 Device (Oxygen Therapy): ventilator      Intake/Output Summary (Last 24 hours) at 12/14/2020 0614  Last data filed at 12/14/2020 0502  Gross per 24 hour   Intake 617.75 ml   Output 1040 ml   Net -422.25 ml       Lines/Drains/Airways     Central Venous Catheter Line            Percutaneous Central Line Insertion/Assessment - Triple Lumen  12/13/20 1909 left internal jugular less than 1 day          Drain                 Urethral Catheter 12/13/20 1906 Latex;Straight-tip 16 Fr. less than 1 day          Airway                  Airway - Non-Surgical -- days          Peripheral Intravenous Line                 Peripheral IV - Single Lumen 12/13/20 1905 18 G Left;Posterior Hand less than 1 day         Peripheral IV - Single Lumen 12/13/20 1905 20 G Posterior;Right Hand less than 1 day                Physical Exam   Constitutional: He appears well-developed and well-nourished. No distress.   HENT:   Head: Normocephalic and atraumatic.   Eyes: EOM are normal.   Neck: Normal range of motion. Neck supple. No JVD present.   Cardiovascular: Normal rate, regular rhythm and intact distal pulses.   Pulmonary/Chest: He has rales (Bibasilar).   On Mechanical Ventillation   Abdominal: Soft. Bowel sounds are normal. He exhibits no distension.   Musculoskeletal:         General: No edema.   Neurological:   sedated   Skin: Skin is warm and dry. He is not diaphoretic. No erythema.       Significant Labs:   ABG:   Recent Labs   Lab 12/13/20  1640   PH 7.351   PCO2 45.7*   HCO3 25.3   POCSATURATED 98   BE 0   , Blood Culture:   Recent Labs   Lab 12/13/20  1653   LABBLOO No Growth to date   , CMP   Recent Labs   Lab 12/13/20  1620 12/14/20  0332    138   K 4.2 3.5    104   CO2 20* 21*   * 101   BUN 7 8   CREATININE 0.7 0.8   CALCIUM 8.0* 8.0*   PROT 6.7 6.0   ALBUMIN 3.7 3.2*   BILITOT 0.4 0.4   ALKPHOS 75 70   AST 22 20   ALT 28 21   ANIONGAP 14 13   ESTGFRAFRICA >60.0 >60.0   EGFRNONAA >60.0 >60.0   , CBC   Recent Labs   Lab 12/13/20  1620 12/14/20  0332   WBC 16.00* 12.32   HGB 13.8* 12.8*   HCT 42.9 39.1*    235   , INR   Recent Labs   Lab 12/13/20  1750   INR 1.0   , Lipid Panel   Recent Labs   Lab 12/13/20  1620   CHOL 183   HDL 39*   LDLCALC 107.6   TRIG 182*   CHOLHDL 21.3   , Troponin   Recent Labs   Lab 12/13/20  1620 12/14/20  0030 12/14/20  0332   TROPONINI <0.006  <0.006  <0.006 0.011 0.015    and All pertinent lab results from the last 24 hours have been reviewed.    Significant Imaging: X-Ray: CXR: X-Ray Chest  PA and Lateral (CXR): 12/13/20 Endotracheal tube tip lies 4.6 cm above the ara.  Nasogastric tube courses below the diaphragm, beyond the field of view.  Left central venous catheter tip projects over the distal SVC.  There is a suspected hiatal hernia.  The cardiomediastinal silhouette is not enlarged noting tortuosity of the aorta..  There is no pleural effusion.  The trachea is midline.  The lungs are symmetrically expanded bilaterally with mildly coarse central hilar interstitial attenuation.  Increased parenchymal attenuation projected along the medial aspect of the right lower lung zone is likely sequela of rotation rather than acute process although follow-up recommended.  There is no pneumothorax.  The osseous structures are remarkable for degenerative changes.       Assessment and Plan:     Brief HPI: 59 y/o M transferred for concern of cardiac arrest. Found to have normal troponin and EKG. Suspect CAP induced Acute respiratory failure with hypoxia and hypercapnia    Acute respiratory failure with hypoxia and hypercapnia  Unclear if patient had become hypoxic at the outside facility leading to his unresponsiveness momentarily and requiring intubation. After intubation at OSH, Patient's repeat ABGs on 100% FiO2 showed pH is 7.25 PCO2 of 58 and PO2 of 57 and his rate was increased to account for hypercapnia.    Repeat ABG 12/14 shows 7.45/37/77/26.1. With minimal vent settings    PLAN:  -- will try to liberate from the vent if patient passes SBT  -- continue to cover For CAP      Essential hypertension  Patient takes amlodipine 5 mg.  Upon [place] to [facility] blood pressure was when the 150s/80s while on Versed.  However previous to that at the outside facility there were reports that the patient was requiring Levophed.    PLAN:  -- will place amlodipine 5 mg on the patient's Mar should he not require pressors while mechanically ventilated.    -- goal blood pressure <130/80    Serum ammonia increased  Unclear  if patient is an active drinker.  With no known history of liver failure. However, outside facility ammonia level was at 45.  Tox screen at outside facility was negative    PLAN:  -- LFTs wnl - continue to monitor  -- salicylates, Tylenol, ethanol level - negative  -- limited right upper quadrant abdominal ultrasound showed hepatic steatosis without acute cholecystitis    On mechanically assisted ventilation  Patient was intubated secondary to being briefly unresponsive at outside facility in order for airway protection.  Will obtain repeat ABG and titrate vent appropriately.    Vent Mode: A/C  Oxygen Concentration (%):  [40] 40  Resp Rate Total:  [16 br/min-18 br/min] 16 br/min  Vt Set:  [500 mL] 500 mL  PEEP/CPAP:  [5 cmH20] 5 cmH20  Mean Airway Pressure:  [9 cmH20-9.6 cmH20] 9 cmH20     PLAN:  -- SAT/SBT daily  -- ABG PRN for vent titration  -- hopeful extubation in the next 24-48 hours    Sepsis  In the setting of left lower lobe pneumonia as seen in the outside facility.     PLAN:  -- Empiric coverage with Azithromycin and CTX  -- blood and sputum cultures NGTD    Community acquired pneumonia  Outside chest x-ray was suggestive for left lower lobe pneumonia and/or possible aspiration pneumonia patient has a leukocytosis of 11,000 with an elevated lactic acidosis at 3.5 in the outside facility.  No recent hospitalizations or sick contacts that were noted.      PLAN:  -- will treat for CAP coverage with azithromycin and ceftriaxone. May add on flagyl if patient does not improve   -- blood and sputum cultures no growth today  -- patient was COVID Negative at outside facility  -- repeat chest x-ray to consistent with pulmonary infiltrates and increased interstitial haziness      Chest pain  Patient transferred from [place] with concerns for cardiac arrest.  Patient was temporarily unresponsive at outside facility.  However, there was no documentation reporting that patient was coded. Patient was moving all  extremities and following commands upon arrival at [facility]. Troponins at outside facility are flat and less than 0.01.  No EKG changes were noted at the outside facility during this time.  Patient does not have a history of heart disease or stroke.  CT head at outside facility showed no acute ischemic changes.  Patient was intubated at outside facility for airway watch and remains intubated here.  Initially upon transfer patient was not adequately sedated and expressed to nursing staff that he was having chest pain.  As patient was intubated he was not able to appropriately describe the pain.     Differential includes PE, ACS, pericarditis, pleuritic chest pain secondary to found pneumonia and possible aspiration pneumonia    Troponins flat, D-dimer only minimally elevated    PLAN:  -- F/U echocardiogram.  No outside records of previous echo for comparison.  -- Continue  Daily EKG and keep patient on telemetry to assess for underlying arrhythmias  -- treat patient's underlying pneumonia with cap coverage (CTX and azithromycin)  -- patient will likely need stress test and ischaemic work up. Will monitor closely to See if this needs to be done inpatient vs OP          VTE Risk Mitigation (From admission, onward)         Ordered     heparin (porcine) injection 5,000 Units  Every 8 hours      12/13/20 1742     IP VTE HIGH RISK PATIENT  Once      12/13/20 1547     Place sequential compression device  Until discontinued      12/13/20 1547                Trini Eaton DO  Cardiology  [facility]

## 2020-12-14 NOTE — PLAN OF CARE
Care Plan    POC reviewed with Korey Aguero and daughter at 0300. Pt intubated/sedated, daughter verbalized understanding. Questions and concerns addressed. No acute events overnight. Mg and K electrolytes replaced. Pt progressing toward goals. Will continue to monitor. See below and flowsheets for full assessment and VS info.       Neuro:  [place]to Coma Scale  Best Eye Response: 3-->(E3) to speech  Best Motor Response: 6-->(M6) obeys commands  Best Verbal Response: 1-->(V1) none  Stephanie Coma Scale Score: 10  Assessment Qualifiers: patient chemically sedated or paralyzed, patient intubated        24hr Temp:  [93.4 °F (34.1 °C)-98.6 °F (37 °C)]     CV:   Rhythm: normal sinus rhythm  BP goals:   MAP > 65    Resp:   O2 Device (Oxygen Therapy): ventilator  Vent Mode: A/C  Set Rate: 16 BPM  Oxygen Concentration (%): 40  Vt Set: 500 mL  PEEP/CPAP: 5 cmH20    Plan: wean to extubate    GI/:     Diet/Nutrition Received: NPO  Last Bowel Movement: 12/12/20  Voiding Characteristics: urethral catheter (bladder)    Intake/Output Summary (Last 24 hours) at 12/14/2020 0649  Last data filed at 12/14/2020 0624  Gross per 24 hour   Intake 726.75 ml   Output 1190 ml   Net -463.25 ml          Labs/Accuchecks:  Recent Labs   Lab 12/14/20  0332   WBC 12.32   RBC 4.26*   HGB 12.8*   HCT 39.1*         Recent Labs   Lab 12/14/20  0332      K 3.5   CO2 21*      BUN 8   CREATININE 0.8   ALKPHOS 70   ALT 21   AST 20   BILITOT 0.4      Recent Labs   Lab 12/13/20  1750   INR 1.0   APTT 24.8      Recent Labs   Lab 12/13/20  1620  12/14/20  0332     --   --    TROPONINI <0.006  <0.006  <0.006   < > 0.015    < > = values in this interval not displayed.       Electrolytes: 1 x Mg and K replacement ordered and administered  Accuchecks: Q6H    Gtts:   propofoL 40 mcg/kg/min (12/14/20 0602)       LDA/Wounds:  Lines/Drains/Airways       Central Venous Catheter Line              Percutaneous Central Line  Insertion/Assessment - Triple Lumen  12/13/20 1909 left internal jugular less than 1 day              Drain                   Urethral Catheter 12/13/20 1906 Latex;Straight-tip 16 Fr. less than 1 day              Airway                   Airway - Non-Surgical -- days              Peripheral Intravenous Line                   Peripheral IV - Single Lumen 12/13/20 1905 18 G Left;Posterior Hand less than 1 day         Peripheral IV - Single Lumen 12/13/20 1905 20 G Posterior;Right Hand less than 1 day                  Wounds: No

## 2020-12-14 NOTE — ASSESSMENT & PLAN NOTE
In the setting of left lower lobe pneumonia as seen in the outside facility.     PLAN:  -- Empiric coverage with Azithromycin and CTX  -- blood and sputum cultures NGTD

## 2020-12-14 NOTE — ASSESSMENT & PLAN NOTE
Unclear if patient had become hypoxic at the outside facility leading to his unresponsiveness momentarily and requiring intubation. After intubation at OSH, Patient's repeat ABGs on 100% FiO2 showed pH is 7.25 PCO2 of 58 and PO2 of 57 and his rate was increased to account for hypercapnia.    Repeat ABG 12/14 shows 7.45/37/77/26.1. With minimal vent settings    PLAN:  -- will try to liberate from the vent if patient passes SBT  -- continue to cover For CAP

## 2020-12-14 NOTE — PLAN OF CARE
Recommendations    Recommendation:   1. Suggest TF of Peptamen Intense VHP @ 10 mL/hr increase to goal rate of 50 mL/hr to provide pt with 1200 kcal, 110 g protein and 1008 mL free water.    -Additional water per MD.  2. If propofol off/decreased, suggest TF of Impact peptide @ 10 mL/hr increase to goal rate of 55 mL/hr to provide pt with 1980 kcal, 124 g protein and 1016 mL free water.    -Additional water per MD.      3.. RD to monitor and follow up    Goals: pt to tolerate >85% of EEN/EPN by RD follow up  Nutrition Goal Status: new  Communication of RD Recs: other (comment)(POC)

## 2020-12-14 NOTE — ASSESSMENT & PLAN NOTE
Patient was intubated secondary to being briefly unresponsive at outside facility in order for airway protection.  Will obtain repeat ABG and titrate vent appropriately.    Vent Mode: A/C  Oxygen Concentration (%):  [40] 40  Resp Rate Total:  [16 br/min-18 br/min] 16 br/min  Vt Set:  [500 mL] 500 mL  PEEP/CPAP:  [5 cmH20] 5 cmH20  Mean Airway Pressure:  [9 cmH20-9.6 cmH20] 9 cmH20     PLAN:  -- SAT/SBT daily  -- ABG PRN for vent titration  -- hopeful extubation in the next 24-48 hours

## 2020-12-14 NOTE — SUBJECTIVE & OBJECTIVE
Interval History: Minimal vent settings. Patient does not appear to have ACS or PE. Will attempt to extubate    Review of Systems   Unable to perform ROS: intubated     Objective:     Vital Signs (Most Recent):  Temp: 98.1 °F (36.7 °C) (12/14/20 0302)  Pulse: 64 (12/14/20 0502)  Resp: 16 (12/14/20 0502)  BP: 126/72 (12/14/20 0502)  SpO2: 100 % (12/14/20 0502) Vital Signs (24h Range):  Temp:  [93.4 °F (34.1 °C)-98.6 °F (37 °C)] 98.1 °F (36.7 °C)  Pulse:  [] 64  Resp:  [16-32] 16  SpO2:  [98 %-100 %] 100 %  BP: (104-161)/(61-96) 126/72     Weight: 112 kg (246 lb 14.4 oz)  Body mass index is 35.94 kg/m².     SpO2: 100 %  O2 Device (Oxygen Therapy): ventilator      Intake/Output Summary (Last 24 hours) at 12/14/2020 0614  Last data filed at 12/14/2020 0502  Gross per 24 hour   Intake 617.75 ml   Output 1040 ml   Net -422.25 ml       Lines/Drains/Airways     Central Venous Catheter Line            Percutaneous Central Line Insertion/Assessment - Triple Lumen  12/13/20 1909 left internal jugular less than 1 day          Drain                 Urethral Catheter 12/13/20 1906 Latex;Straight-tip 16 Fr. less than 1 day          Airway                 Airway - Non-Surgical -- days          Peripheral Intravenous Line                 Peripheral IV - Single Lumen 12/13/20 1905 18 G Left;Posterior Hand less than 1 day         Peripheral IV - Single Lumen 12/13/20 1905 20 G Posterior;Right Hand less than 1 day                Physical Exam   Constitutional: He appears well-developed and well-nourished. No distress.   HENT:   Head: Normocephalic and atraumatic.   Eyes: EOM are normal.   Neck: Normal range of motion. Neck supple. No JVD present.   Cardiovascular: Normal rate, regular rhythm and intact distal pulses.   Pulmonary/Chest: He has rales (Bibasilar).   On Mechanical Ventillation   Abdominal: Soft. Bowel sounds are normal. He exhibits no distension.   Musculoskeletal:         General: No edema.   Neurological:   sedated    Skin: Skin is warm and dry. He is not diaphoretic. No erythema.       Significant Labs:   ABG:   Recent Labs   Lab 12/13/20  1640   PH 7.351   PCO2 45.7*   HCO3 25.3   POCSATURATED 98   BE 0   , Blood Culture:   Recent Labs   Lab 12/13/20  1653   LABBLOO No Growth to date   , CMP   Recent Labs   Lab 12/13/20  1620 12/14/20  0332    138   K 4.2 3.5    104   CO2 20* 21*   * 101   BUN 7 8   CREATININE 0.7 0.8   CALCIUM 8.0* 8.0*   PROT 6.7 6.0   ALBUMIN 3.7 3.2*   BILITOT 0.4 0.4   ALKPHOS 75 70   AST 22 20   ALT 28 21   ANIONGAP 14 13   ESTGFRAFRICA >60.0 >60.0   EGFRNONAA >60.0 >60.0   , CBC   Recent Labs   Lab 12/13/20  1620 12/14/20  0332   WBC 16.00* 12.32   HGB 13.8* 12.8*   HCT 42.9 39.1*    235   , INR   Recent Labs   Lab 12/13/20  1750   INR 1.0   , Lipid Panel   Recent Labs   Lab 12/13/20  1620   CHOL 183   HDL 39*   LDLCALC 107.6   TRIG 182*   CHOLHDL 21.3   , Troponin   Recent Labs   Lab 12/13/20  1620 12/14/20  0030 12/14/20  0332   TROPONINI <0.006  <0.006  <0.006 0.011 0.015    and All pertinent lab results from the last 24 hours have been reviewed.    Significant Imaging: X-Ray: CXR: X-Ray Chest PA and Lateral (CXR): 12/13/20 Endotracheal tube tip lies 4.6 cm above the ara.  Nasogastric tube courses below the diaphragm, beyond the field of view.  Left central venous catheter tip projects over the distal SVC.  There is a suspected hiatal hernia.  The cardiomediastinal silhouette is not enlarged noting tortuosity of the aorta..  There is no pleural effusion.  The trachea is midline.  The lungs are symmetrically expanded bilaterally with mildly coarse central hilar interstitial attenuation.  Increased parenchymal attenuation projected along the medial aspect of the right lower lung zone is likely sequela of rotation rather than acute process although follow-up recommended.  There is no pneumothorax.  The osseous structures are remarkable for degenerative changes.

## 2020-12-14 NOTE — PLAN OF CARE
CM met with patient/family at the bedside to discuss D/C POC needs. Patient intubated and unable to verify demographics in the chart are correct. Wife (Coby) at bedside and admission assessment completed.  CM name and contact number listed on the patient's white board.  CM provided explanation of discharge plan process. CM left dc planning booklet at the bedside with explanation of qualification for placement and facility resources. Coby expressed understanding. CM remains available for any further patient needs or concerns.   Lives at home w/ spouse.  No steps to home.    Nader Valiente MD  [address] [facility] MEDICIAL / [facility]*      [id number] - [place], [place] - [address] AT [facility]  [address]  [address]  [place] [address]  Phone: [phone] Fax: [phone]      Extended Emergency Contact Information  Primary Emergency Contact: Coby Aguero  Address: [address] [place] [address] [facility]  Home Phone: [phone]  Mobile Phone: [phone]  Relation: Spouse    Payor: BLUE CROSS BLUE [employer]IELD / Plan: [facility] FEDERAL / Product Type: PPO /     Cardiac arrest [I46.9]       12/14/20 1103   Discharge Assessment   Assessment Type Discharge Planning Assessment   Confirmed/corrected address and phone number on facesheet? Yes   Assessment information obtained from? Caregiver   Prior to hospitilization cognitive status: Alert/Oriented   Prior to hospitalization functional status: Independent   Current cognitive status: Coma/Sedated/Intubated   Current Functional Status: Completely Dependent   Facility Arrived From: [facility]   Lives With spouse   Able to Return to Prior Arrangements other (see comments)  (TBD)   Is patient able to care for self after discharge? Unable to determine at this time (comments)   Who are your caregiver(s) and their phone number(s)? AgueroWilderCoby Spouse [phone]    [phone]   Readmission Within the Last 30 Days no previous admission in last 30 days   Patient currently being followed by outpatient case management? No   Patient currently receives any other outside agency services? No   Equipment Currently Used at Home none   Do you have any problems affording any of your prescribed medications? TBD   Is the patient taking medications as prescribed? yes   Does the patient have transportation home? Yes   Transportation Anticipated family or friend will provide   Does the patient receive services at the [facility]? No   Discharge Plan A Home with family   Discharge Plan B Home Health   DME Needed Upon Discharge  other (see comments)  (TBD)       Merritt Miguel MSN, RN-BC  Critical Care-   Ext. [phone]

## 2020-12-14 NOTE — CONSULTS
"  [facility]  Adult Nutrition  Consult Note    SUMMARY     Recommendations    Recommendation:   1. Suggest TF of Peptamen Intense VHP @ 10 mL/hr increase to goal rate of 50 mL/hr to provide pt with 1200 kcal, 110 g protein and 1008 mL free water.    -Additional water per MD.  2. If propofol off/decreased, suggest TF of Impact peptide @ 10 mL/hr increase to goal rate of 55 mL/hr to provide pt with 1980 kcal, 124 g protein and 1016 mL free water.    -Additional water per MD.      3.. RD to monitor and follow up    Goals: pt to tolerate >85% of EEN/EPN by RD follow up  Nutrition Goal Status: new  Communication of RD Recs: other (comment)(POC)    Reason for Assessment    Reason For Assessment: consult  Diagnosis: (cardiac arrest)  Relevant Medical History: none  Interdisciplinary Rounds: did not attend  General Information Comments: Pt intubated and sedated at this time, unable to see pt and no family in room at time of visit. Pt NPO with no means of nutrition at this time. No recent wt hx in chart, shows last wt and dose wt @ 20 lbs difference, unsure of accuracy. Unable to complete NPFE at this time, will assess on f/u as needed.  Nutrition Discharge Planning: unable to determine    Nutrition Risk Screen    Nutrition Risk Screen: tube feeding or parenteral nutrition    Nutrition/Diet History    Food Allergies: NKFA  Factors Affecting Nutritional Intake: NPO, on mechanical ventilation    Anthropometrics    Temp: 98.9 °F (37.2 °C)  Height Method: Estimated  Height: 5' 9.5" (176.5 cm)  Height (inches): 69.5 in  Weight Method: Bed Scale  Weight: 111.6 kg (246 lb)  Weight (lb): 246 lb  Ideal Body Weight (IBW), Male: 163 lb  % Ideal Body Weight, Male (lb): 150.92 %  BMI (Calculated): 35.8  BMI Grade: 35 - 39.9 - obesity - grade II     Lab/Procedures/Meds    Pertinent Labs Reviewed: reviewed  Pertinent Labs Comments: Ca 8.0  Pertinent Medications Reviewed: reviewed  Pertinent Medications Comments: " famotidine; heparin; amlodipine     Estimated/Assessed Needs    Weight Used For Calorie Calculations: 99.8 kg (220 lb 0.3 oz)  Energy Calorie Requirements (kcal): 1942 kcal/d  Energy Need Method: [place]  Protein Requirements: 100-120 g/day  Weight Used For Protein Calculations: 99.8 kg (220 lb 0.3 oz)  Fluid Requirements (mL): 1 mL/kcal or per MD  RDA Method (mL): 1942    Nutrition Prescription Ordered    Current Diet Order: NPO    Evaluation of Received Nutrient/Fluid Intake    Other Calories (kcal): 554(propofol)  Total Calories (kcal): 554  I/O: -.4 L since admit  Energy Calories Required: not meeting needs  Protein Required: not meeting needs  Fluid Required: other (see comments)  Comments: LBM 12/12  Tolerance: tolerating  % Intake of Estimated Energy Needs: 0 - 25 %  % Meal Intake: NPO    Nutrition Risk    Level of Risk/Frequency of Follow-up: low     Assessment and Plan  Nutrition Problem  inadequate energy intake     Related to (etiology):   Inability to consume sufficient energy     Signs and Symptoms (as evidenced by):   Pt NPO with no means of nutrition     Interventions  (treatment strategy):  Collaboration of care with other providers    Nutrition Diagnosis Status:   New     Monitor and Evaluation    Food and Nutrient Intake: energy intake, enteral nutrition intake  Food and Nutrient Adminstration: enteral and parenteral nutrition administration  Anthropometric Measurements: weight, weight change  Biochemical Data, Medical Tests and Procedures: electrolyte and renal panel, gastrointestinal profile, glucose/endocrine profile, inflammatory profile, lipid profile  Nutrition-Focused Physical Findings: overall appearance     Nutrition Follow-Up    RD Follow-up?: Yes

## 2020-12-14 NOTE — ASSESSMENT & PLAN NOTE
Unclear if patient is an active drinker.  With no known history of liver failure. However, outside facility ammonia level was at 45.  Tox screen at outside facility was negative    PLAN:  -- LFTs wnl - continue to monitor  -- salicylates, Tylenol, ethanol level - negative  -- limited right upper quadrant abdominal ultrasound showed hepatic steatosis without acute cholecystitis

## 2020-12-14 NOTE — ASSESSMENT & PLAN NOTE
Patient takes amlodipine 5 mg.  Upon arrival to [facility] [place] [facility] blood pressure was when the 150s/80s while on Versed.  However previous to that at the outside facility there were reports that the patient was requiring Levophed.    PLAN:  -- will place amlodipine 5 mg on the patient's Mar should he not require pressors while mechanically ventilated.    -- goal blood pressure <130/80

## 2020-12-14 NOTE — ASSESSMENT & PLAN NOTE
Outside chest x-ray was suggestive for left lower lobe pneumonia and/or possible aspiration pneumonia patient has a leukocytosis of 11,000 with an elevated lactic acidosis at 3.5 in the outside facility.  No recent hospitalizations or sick contacts that were noted.      PLAN:  -- will treat for CAP coverage with azithromycin and ceftriaxone. May add on flagyl if patient does not improve   -- blood and sputum cultures no growth today  -- patient was COVID Negative at outside facility  -- repeat chest x-ray to consistent with pulmonary infiltrates and increased interstitial haziness

## 2020-12-14 NOTE — HOSPITAL COURSE
60-year-old male transferred for suspected cardiac arrest after being found unresponsive upon admittance to the ED. Troponin negative EKG at outside facility showed no acute abnormalities. D-dimer negative, patient was intubated for airway protection.  CT head negative for acute abnormality the outside facility.  Patient appears to be septic secondary to community-acquired pneumonia. Liberated from the ventilator 12/14. TTE unremarkable. Patient remains asymptomatic from a cardiac stand point. Pharm stress test showed no ischemia. Patient should follow up with his PCP in one week. He was discharged home with no needs.

## 2020-12-15 VITALS
RESPIRATION RATE: 18 BRPM | HEIGHT: 69 IN | OXYGEN SATURATION: 96 % | WEIGHT: 235 LBS | TEMPERATURE: 98 F | SYSTOLIC BLOOD PRESSURE: 151 MMHG | DIASTOLIC BLOOD PRESSURE: 87 MMHG | BODY MASS INDEX: 34.8 KG/M2 | HEART RATE: 75 BPM

## 2020-12-15 PROBLEM — E72.20 SERUM AMMONIA INCREASED: Status: RESOLVED | Noted: 2020-12-13 | Resolved: 2020-12-15

## 2020-12-15 PROBLEM — A41.9 SEPSIS: Status: RESOLVED | Noted: 2020-12-13 | Resolved: 2020-12-15

## 2020-12-15 PROBLEM — R07.9 CHEST PAIN: Status: RESOLVED | Noted: 2020-12-13 | Resolved: 2020-12-15

## 2020-12-15 PROBLEM — Z99.11 ON MECHANICALLY ASSISTED VENTILATION: Status: RESOLVED | Noted: 2020-12-13 | Resolved: 2020-12-15

## 2020-12-15 LAB
ALBUMIN SERPL BCP-MCNC: 3 G/DL (ref 3.5–5.2)
ALP SERPL-CCNC: 65 U/L (ref 55–135)
ALT SERPL W/O P-5'-P-CCNC: 17 U/L (ref 10–44)
ANION GAP SERPL CALC-SCNC: 7 MMOL/L (ref 8–16)
AST SERPL-CCNC: 18 U/L (ref 10–40)
BASOPHILS # BLD AUTO: 0.03 K/UL (ref 0–0.2)
BASOPHILS NFR BLD: 0.3 % (ref 0–1.9)
BILIRUB SERPL-MCNC: 0.7 MG/DL (ref 0.1–1)
BUN SERPL-MCNC: 10 MG/DL (ref 6–20)
CALCIUM SERPL-MCNC: 8.4 MG/DL (ref 8.7–10.5)
CHLORIDE SERPL-SCNC: 107 MMOL/L (ref 95–110)
CO2 SERPL-SCNC: 28 MMOL/L (ref 23–29)
CREAT SERPL-MCNC: 0.9 MG/DL (ref 0.5–1.4)
CV STRESS BASE HR: 64 BPM
DIASTOLIC BLOOD PRESSURE: 72 MMHG
DIFFERENTIAL METHOD: ABNORMAL
EOSINOPHIL # BLD AUTO: 0.1 K/UL (ref 0–0.5)
EOSINOPHIL NFR BLD: 0.6 % (ref 0–8)
ERYTHROCYTE [DISTWIDTH] IN BLOOD BY AUTOMATED COUNT: 13.9 % (ref 11.5–14.5)
EST. GFR  (AFRICAN AMERICAN): >60 ML/MIN/1.73 M^2
EST. GFR  (NON AFRICAN AMERICAN): >60 ML/MIN/1.73 M^2
GLUCOSE SERPL-MCNC: 97 MG/DL (ref 70–110)
HCT VFR BLD AUTO: 38.9 % (ref 40–54)
HGB BLD-MCNC: 12.5 G/DL (ref 14–18)
IMM GRANULOCYTES # BLD AUTO: 0.09 K/UL (ref 0–0.04)
IMM GRANULOCYTES NFR BLD AUTO: 0.8 % (ref 0–0.5)
LYMPHOCYTES # BLD AUTO: 3.1 K/UL (ref 1–4.8)
LYMPHOCYTES NFR BLD: 28.7 % (ref 18–48)
MAGNESIUM SERPL-MCNC: 2.2 MG/DL (ref 1.6–2.6)
MCH RBC QN AUTO: 30.1 PG (ref 27–31)
MCHC RBC AUTO-ENTMCNC: 32.1 G/DL (ref 32–36)
MCV RBC AUTO: 94 FL (ref 82–98)
MONOCYTES # BLD AUTO: 0.9 K/UL (ref 0.3–1)
MONOCYTES NFR BLD: 8.6 % (ref 4–15)
NEUTROPHILS # BLD AUTO: 6.5 K/UL (ref 1.8–7.7)
NEUTROPHILS NFR BLD: 61 % (ref 38–73)
NRBC BLD-RTO: 0 /100 WBC
OHS CV CPX 1 MINUTE RECOVERY HEART RATE: 93 BPM
OHS CV CPX 85 PERCENT MAX PREDICTED HEART RATE MALE: 137
OHS CV CPX MAX PREDICTED HEART RATE: 161
OHS CV CPX PATIENT IS FEMALE: 0
OHS CV CPX PATIENT IS MALE: 1
OHS CV CPX PEAK DIASTOLIC BLOOD PRESSURE: 70 MMHG
OHS CV CPX PEAK HEAR RATE: 70 BPM
OHS CV CPX PEAK RATE PRESSURE PRODUCT: NORMAL
OHS CV CPX PEAK SYSTOLIC BLOOD PRESSURE: 179 MMHG
OHS CV CPX PERCENT MAX PREDICTED HEART RATE ACHIEVED: 43
OHS CV CPX RATE PRESSURE PRODUCT PRESENTING: NORMAL
PHOSPHATE SERPL-MCNC: 3.4 MG/DL (ref 2.7–4.5)
PLATELET # BLD AUTO: 208 K/UL (ref 150–350)
PMV BLD AUTO: 10.8 FL (ref 9.2–12.9)
POTASSIUM SERPL-SCNC: 3.4 MMOL/L (ref 3.5–5.1)
POTASSIUM SERPL-SCNC: 3.9 MMOL/L (ref 3.5–5.1)
PROT SERPL-MCNC: 6.1 G/DL (ref 6–8.4)
RBC # BLD AUTO: 4.15 M/UL (ref 4.6–6.2)
SODIUM SERPL-SCNC: 142 MMOL/L (ref 136–145)
SYSTOLIC BLOOD PRESSURE: 176 MMHG
WBC # BLD AUTO: 10.63 K/UL (ref 3.9–12.7)

## 2020-12-15 PROCEDURE — 94761 N-INVAS EAR/PLS OXIMETRY MLT: CPT

## 2020-12-15 PROCEDURE — 25000003 PHARM REV CODE 250: Performed by: STUDENT IN AN ORGANIZED HEALTH CARE EDUCATION/TRAINING PROGRAM

## 2020-12-15 PROCEDURE — 99233 SBSQ HOSP IP/OBS HIGH 50: CPT | Mod: ,,, | Performed by: INTERNAL MEDICINE

## 2020-12-15 PROCEDURE — 84100 ASSAY OF PHOSPHORUS: CPT

## 2020-12-15 PROCEDURE — 99233 PR SUBSEQUENT HOSPITAL CARE,LEVL III: ICD-10-PCS | Mod: ,,, | Performed by: INTERNAL MEDICINE

## 2020-12-15 PROCEDURE — 80053 COMPREHEN METABOLIC PANEL: CPT

## 2020-12-15 PROCEDURE — 93010 ELECTROCARDIOGRAM REPORT: CPT | Mod: ,,, | Performed by: INTERNAL MEDICINE

## 2020-12-15 PROCEDURE — 84132 ASSAY OF SERUM POTASSIUM: CPT

## 2020-12-15 PROCEDURE — 93005 ELECTROCARDIOGRAM TRACING: CPT

## 2020-12-15 PROCEDURE — 83735 ASSAY OF MAGNESIUM: CPT

## 2020-12-15 PROCEDURE — 25000003 PHARM REV CODE 250: Performed by: INTERNAL MEDICINE

## 2020-12-15 PROCEDURE — 85025 COMPLETE CBC W/AUTO DIFF WBC: CPT

## 2020-12-15 PROCEDURE — 93010 EKG 12-LEAD: ICD-10-PCS | Mod: ,,, | Performed by: INTERNAL MEDICINE

## 2020-12-15 PROCEDURE — 63600175 PHARM REV CODE 636 W HCPCS: Performed by: STUDENT IN AN ORGANIZED HEALTH CARE EDUCATION/TRAINING PROGRAM

## 2020-12-15 RX ORDER — AMLODIPINE BESYLATE 10 MG/1
10 TABLET ORAL DAILY
Qty: 90 TABLET | Refills: 3 | Status: SHIPPED | OUTPATIENT
Start: 2020-12-15 | End: 2021-03-23 | Stop reason: SDUPTHER

## 2020-12-15 RX ORDER — AZITHROMYCIN 250 MG/1
250 TABLET, FILM COATED ORAL DAILY
Qty: 1 TABLET | Refills: 0 | Status: SHIPPED | OUTPATIENT
Start: 2020-12-15 | End: 2020-12-15 | Stop reason: SDUPTHER

## 2020-12-15 RX ORDER — AZITHROMYCIN 500 MG/1
500 TABLET, FILM COATED ORAL DAILY
Qty: 1 TABLET | Refills: 0 | Status: SHIPPED | OUTPATIENT
Start: 2020-12-16 | End: 2020-12-15 | Stop reason: HOSPADM

## 2020-12-15 RX ORDER — POTASSIUM CHLORIDE 20 MEQ/1
40 TABLET, EXTENDED RELEASE ORAL ONCE
Status: COMPLETED | OUTPATIENT
Start: 2020-12-15 | End: 2020-12-15

## 2020-12-15 RX ORDER — CEFPODOXIME PROXETIL 200 MG/1
200 TABLET, FILM COATED ORAL EVERY 12 HOURS
Qty: 4 TABLET | Refills: 0 | Status: SHIPPED | OUTPATIENT
Start: 2020-12-15 | End: 2020-12-15 | Stop reason: SDUPTHER

## 2020-12-15 RX ORDER — AMLODIPINE BESYLATE 5 MG/1
10 TABLET ORAL DAILY
Qty: 90 TABLET | Refills: 3 | Status: SHIPPED | OUTPATIENT
Start: 2020-12-15 | End: 2020-12-15 | Stop reason: SDUPTHER

## 2020-12-15 RX ORDER — REGADENOSON 0.08 MG/ML
0.4 INJECTION, SOLUTION INTRAVENOUS
Status: DISCONTINUED | OUTPATIENT
Start: 2020-12-15 | End: 2020-12-15 | Stop reason: HOSPADM

## 2020-12-15 RX ORDER — AMLODIPINE BESYLATE 5 MG/1
5 TABLET ORAL DAILY
Status: ON HOLD | COMMUNITY
End: 2020-12-15 | Stop reason: SDUPTHER

## 2020-12-15 RX ORDER — CEFPODOXIME PROXETIL 200 MG/1
200 TABLET, FILM COATED ORAL EVERY 12 HOURS
Qty: 4 TABLET | Refills: 0 | Status: SHIPPED | OUTPATIENT
Start: 2020-12-16 | End: 2020-12-18

## 2020-12-15 RX ADMIN — POTASSIUM CHLORIDE 40 MEQ: 1500 TABLET, EXTENDED RELEASE ORAL at 06:12

## 2020-12-15 RX ADMIN — CHLORHEXIDINE GLUCONATE 0.12% ORAL RINSE 15 ML: 1.2 LIQUID ORAL at 08:12

## 2020-12-15 RX ADMIN — MUPIROCIN: 20 OINTMENT TOPICAL at 08:12

## 2020-12-15 RX ADMIN — CEFTRIAXONE 2 G: 2 INJECTION, SOLUTION INTRAVENOUS at 03:12

## 2020-12-15 RX ADMIN — FAMOTIDINE 20 MG: 10 INJECTION INTRAVENOUS at 08:12

## 2020-12-15 RX ADMIN — HEPARIN SODIUM 5000 UNITS: 5000 INJECTION INTRAVENOUS; SUBCUTANEOUS at 02:12

## 2020-12-15 RX ADMIN — AZITHROMYCIN MONOHYDRATE 500 MG: 500 INJECTION, POWDER, LYOPHILIZED, FOR SOLUTION INTRAVENOUS at 03:12

## 2020-12-15 RX ADMIN — AMLODIPINE BESYLATE 10 MG: 10 TABLET ORAL at 08:12

## 2020-12-15 RX ADMIN — HEPARIN SODIUM 5000 UNITS: 5000 INJECTION INTRAVENOUS; SUBCUTANEOUS at 06:12

## 2020-12-15 NOTE — PROGRESS NOTES
[facility]  Cardiology  Progress Note    Patient Name: Korey Aguero  MRN: [id number]  Admission Date: 12/13/2020  Hospital Length of Stay: 2 days  Code Status: Full Code   Attending Physician: Ab Valdez MD   Primary Care Physician: Nader Valiente MD  Expected Discharge Date: 12/21/2020  Principal Problem:<principal problem not specified>    Subjective:     Hospital Course:   60-year-old male transferred for suspected cardiac arrest after being found unresponsive upon admittance to the ED. Troponin negative EKG at outside facility showed no acute abnormalities. D-dimer negative, patient was intubated for airway protection.  CT head negative for acute abnormality the outside facility.  Patient appears to be septic secondary to community-acquired pneumonia. Liberated from the ventilator 12/14. TTE unremarkable. Patient remains asymptomatic from a cardiac stand point. Will do ischemic evaluation with nuclear stress test before discharge.    Interval History: Liberated from the vent 12/14 without issue. Patient has remained asymptomatic from a cardiac stand point. However, due to questionable cardiac arrest at OSH will do a nuclear stress test.    Review of Systems   Constitution: Negative for chills, decreased appetite and fever.   Eyes: Negative for double vision and pain.   Cardiovascular: Negative for chest pain and palpitations.   Respiratory: Negative for cough and shortness of breath.    Musculoskeletal: Negative for muscle weakness.   Gastrointestinal: Negative for bloating and abdominal pain.   Genitourinary: Negative for flank pain.   Neurological: Negative for headaches, light-headedness and weakness.   Psychiatric/Behavioral: Negative for hallucinations. The patient is not nervous/anxious.      Objective:     Vital Signs (Most Recent):  Temp: 99.3 °F (37.4 °C) (12/15/20 0300)  Pulse: (!) 56 (12/15/20 0500)  Resp: 17 (12/15/20 0500)  BP: (!) 146/66 (12/15/20 0500)  SpO2: 98 %  (12/15/20 0500) Vital Signs (24h Range):  Temp:  [98.6 °F (37 °C)-99.5 °F (37.5 °C)] 99.3 °F (37.4 °C)  Pulse:  [56-96] 56  Resp:  [0-33] 17  SpO2:  [93 %-100 %] 98 %  BP: (119-171)/() 146/66     Weight: 106.8 kg (235 lb 6.4 oz)  Body mass index is 34.26 kg/m².     SpO2: 98 %  O2 Device (Oxygen Therapy): nasal cannula      Intake/Output Summary (Last 24 hours) at 12/15/2020 0556  Last data filed at 12/15/2020 0500  Gross per 24 hour   Intake 749 ml   Output 2100 ml   Net -1351 ml       Lines/Drains/Airways     Central Venous Catheter Line            Percutaneous Central Line Insertion/Assessment - Triple Lumen  12/13/20 1909 left internal jugular 1 day          Peripheral Intravenous Line                 Peripheral IV - Single Lumen 12/13/20 1905 18 G Left;Posterior Hand 1 day                Physical Exam   Constitutional: He is oriented to person, place, and time. He appears well-developed and well-nourished. No distress.   HENT:   Head: Normocephalic and atraumatic.   Eyes: EOM are normal.   Neck: Normal range of motion. Neck supple. No JVD present.   Cardiovascular: Normal rate, regular rhythm and intact distal pulses.   Pulmonary/Chest: Effort normal. He has no rales.   Abdominal: Soft. Bowel sounds are normal. He exhibits no distension.   Musculoskeletal:         General: No edema.   Neurological: He is alert and oriented to person, place, and time.   Skin: Skin is warm and dry. He is not diaphoretic. No erythema.       Significant Labs:   ABG:   Recent Labs   Lab 12/13/20  1640 12/14/20  0724   PH 7.351 7.456*   PCO2 45.7* 37.0   HCO3 25.3 26.1   POCSATURATED 98 96   BE 0 2   , Blood Culture:   Recent Labs   Lab 12/13/20  1653 12/13/20  1753   LABBLOO No Growth to date  No Growth to date No Growth to date   , CMP   Recent Labs   Lab 12/13/20  1620 12/14/20  0332 12/15/20  0339    138 142   K 4.2 3.5 3.4*    104 107   CO2 20* 21* 28   * 101 97   BUN 7 8 10   CREATININE 0.7 0.8 0.9    CALCIUM 8.0* 8.0* 8.4*   PROT 6.7 6.0 6.1   ALBUMIN 3.7 3.2* 3.0*   BILITOT 0.4 0.4 0.7   ALKPHOS 75 70 65   AST 22 20 18   ALT 28 21 17   ANIONGAP 14 13 7*   ESTGFRAFRICA >60.0 >60.0 >60.0   EGFRNONAA >60.0 >60.0 >60.0   , CBC   Recent Labs   Lab 12/13/20  1620 12/14/20  0332 12/15/20  0339   WBC 16.00* 12.32 10.63   HGB 13.8* 12.8* 12.5*   HCT 42.9 39.1* 38.9*    235 208   , INR   Recent Labs   Lab 12/13/20  1750   INR 1.0   , Lipid Panel   Recent Labs   Lab 12/13/20  1620   CHOL 183   HDL 39*   LDLCALC 107.6   TRIG 182*   CHOLHDL 21.3   , Troponin   Recent Labs   Lab 12/14/20  0030 12/14/20  0332 12/14/20  0934   TROPONINI 0.011 0.015 0.009    and All pertinent lab results from the last 24 hours have been reviewed.    Significant Imaging: X-Ray: CXR: X-Ray Chest PA and Lateral (CXR): 12/13/20 Endotracheal tube tip lies 4.6 cm above the ara.  Nasogastric tube courses below the diaphragm, beyond the field of view.  Left central venous catheter tip projects over the distal SVC.  There is a suspected hiatal hernia.  The cardiomediastinal silhouette is not enlarged noting tortuosity of the aorta..  There is no pleural effusion.  The trachea is midline.  The lungs are symmetrically expanded bilaterally with mildly coarse central hilar interstitial attenuation.  Increased parenchymal attenuation projected along the medial aspect of the right lower lung zone is likely sequela of rotation rather than acute process although follow-up recommended.  There is no pneumothorax.  The osseous structures are remarkable for degenerative changes.       Assessment and Plan:     Brief HPI: 59 y/o M transferred for concern of cardiac arrest. Found to have normal troponin and EKG. Suspect CAP induced Acute respiratory failure with hypoxia and hypercapnia. However, will do an ischemic eval.    Acute respiratory failure with hypoxia and hypercapnia  Unclear if patient had become hypoxic at the outside facility leading to his  unresponsiveness momentarily and requiring intubation. After intubation at OSH, Patient's repeat ABGs on 100% FiO2 showed pH is 7.25 PCO2 of 58 and PO2 of 57 and his rate was increased to account for hypercapnia.    Repeat ABG 12/14 shows 7.45/37/77/26.1. With minimal vent settings    PLAN:  -- liberated from the vent on 12/14. Now on 2L NC satting >92%. Wean O2 to tolerance  -- continue to cover for CAP      Essential hypertension  Patient takes amlodipine 5 mg.  Upon arrival to [facility] blood pressure was when the 150s/80s while on Versed.  However previous to that at the outside facility there were reports that the patient was requiring Levophed.    PLAN:  -- Increase Amlodipine to 10 mg QD  -- Will add additional antihypertensives as warranted  -- goal blood pressure <130/80    Serum ammonia increased  Unclear if patient is an active drinker.  With no known history of liver failure. However, outside facility ammonia level was at 45.  Tox screen at outside facility was negative  LFTs wnl - continue to monitor  salicylates, Tylenol, ethanol level - negative  limited right upper quadrant abdominal ultrasound showed hepatic steatosis without acute cholecystitis    PLAN:  -- Monitor CMP for changes    Sepsis  In the setting of left lower lobe pneumonia as seen in the outside facility.     PLAN:  -- Empiric coverage with Azithromycin and CTX  -- blood and sputum cultures NGTD    Community acquired pneumonia  Outside chest x-ray was suggestive for left lower lobe pneumonia and/or possible aspiration pneumonia patient has a leukocytosis of 11,000 with an elevated lactic acidosis at 3.5 in the outside facility.  No recent hospitalizations or sick contacts that were noted.      PLAN:  -- will treat for CAP coverage with azithromycin and ceftriaxone  -- blood and sputum cultures no growth today  -- patient was COVID Negative at outside facility        Chest pain  Patient transferred from [place] with concerns for cardiac  arrest.  Patient was temporarily unresponsive at outside facility.  However, there was no documentation reporting that patient was coded. Patient was moving all extremities and following commands upon arrival at [facility]. Troponins at outside facility are flat and less than 0.01.  No EKG changes were noted at the outside facility during this time.  Patient does not have a history of heart disease or stroke.  CT head at outside facility showed no acute ischemic changes.  Patient was intubated at outside facility for airway watch and remains intubated here.  Initially upon transfer patient was not adequately sedated and expressed to nursing staff that he was having chest pain.  As patient was intubated he was not able to appropriately describe the pain.     Differential includes PE, ACS, pericarditis, pleuritic chest pain secondary to found pneumonia and possible aspiration pneumonia    Following studies showed flat Troponins, D-dimer only minimally elevated, echocardiogram unremarkable    PLAN:  -- Continue telemetry to assess for underlying arrhythmias  -- treat patient's underlying pneumonia with cap coverage (CTX and azithromycin)  -- patient to undergo a nuclear stress test as apart of ischaemic work up as do not have a clear cause for patient's described arrest at the OSH        VTE Risk Mitigation (From admission, onward)         Ordered     heparin (porcine) injection 5,000 Units  Every 8 hours      12/13/20 1742     IP VTE HIGH RISK PATIENT  Once      12/13/20 1547     Place sequential compression device  Until discontinued      12/13/20 1547                Trini Eaton DO  Cardiology  [facility]

## 2020-12-15 NOTE — PLAN OF CARE
CMICU DAILY GOALS       A: Awake    RASS: Goal - RASS Goal: 0-->alert and calm  Actual - RASS (Tse Agitation-Sedation Scale): 0-->alert and calm   Restraint necessity: Clinical Justification: Removing medical devices  B: Breath   SBT: Not intubated   C: Coordinate A & B, analgesics/sedatives   Pain: managed    SAT: Not intubated  D: Delirium   CAM-ICU: Overall CAM-ICU: Negative  E: Early(intubated/ Progressive (non-intubated) Mobility   MOVE Screen: Pass   Activity: Activity Management: rolling - L1  FAS: Feeding/Nutrition   Diet order: Diet/Nutrition Received: 2 gram sodium,   Fluid restriction:    T: Thrombus   DVT prophylaxis: VTE Required Core Measure: (SCDs) Sequential compression device initiated/maintained  H: HOB Elevation   Head of Bed (HOB): HOB at 30 degrees  U: Ulcer Prophylaxis   GI: yes  G: Glucose control   managed Glycemic Management: blood glucose monitoring, oral hydration promoted  S: Skin   Bundle compliance: yes   Bathing/Skin Care: incontinence care Date: 12/15/2020  B: Bowel Function   no issues   I: Indwelling Catheters   Beaver necessity: [REMOVED]      Urethral Catheter 12/13/20 1906 Latex;Straight-tip 16 Fr.-Reason for Continuing Urinary Catheterization: Critically ill in ICU and requiring hourly monitoring of intake/output   CVC necessity: Yes   IPAD offered: Yes  D: De-escalation Antibx   Yes  Plan for the day   In patient stress test and ischemia workup 12/15/2020  Family/Goals of care/Code Status   Code Status: Full Code     No acute events throughout day, VS and assessment per flow sheet, patient progressing towards goals as tolerated, plan of care reviewed with Korey Aguero and family, all concerns addressed, will continue to monitor.

## 2020-12-15 NOTE — ASSESSMENT & PLAN NOTE
Unclear if patient had become hypoxic at the outside facility leading to his unresponsiveness momentarily and requiring intubation. After intubation at OSH, Patient's repeat ABGs on 100% FiO2 showed pH is 7.25 PCO2 of 58 and PO2 of 57 and his rate was increased to account for hypercapnia.    Repeat ABG 12/14 shows 7.45/37/77/26.1. With minimal vent settings    PLAN:  -- liberated from the vent on 12/14. Now on 2L NC satting >92%. Wean O2 to tolerance  -- continue to cover for CAP

## 2020-12-15 NOTE — ASSESSMENT & PLAN NOTE
Patient was intubated secondary to being briefly unresponsive at outside facility in order for airway protection.  Will obtain repeat ABG and titrate vent appropriately.    Vent Mode: SPONT-PS  Oxygen Concentration (%):  [40] 40  Resp Rate Total:  [16 br/min-26 br/min] 26 br/min  Vt Set:  [5 mL-500 mL] 5 mL  PEEP/CPAP:  [5 cmH20] 5 cmH20  Pressure Support:  [5 cmH20] 5 cmH20  Mean Airway Pressure:  [7.2 cmH20-9 cmH20] 7.2 cmH20     PLAN:  -- SAT/SBT daily  -- ABG PRN for vent titration  -- hopeful extubation in the next 24-48 hours

## 2020-12-15 NOTE — PLAN OF CARE
CMICU DAILY GOALS       A: Awake    RASS: Goal - RASS Goal: 0-->alert and calm  Actual - RASS (Tse Agitation-Sedation Scale): 0-->alert and calm   Restraint necessity: Clinical Justification: Removing medical devices  B: Breath   SBT: Not intubated   C: Coordinate A & B, analgesics/sedatives   Pain: managed    SAT: Not intubated  D: Delirium   CAM-ICU: Overall CAM-ICU: Negative  E: Early(intubated/ Progressive (non-intubated) Mobility   MOVE Screen: Pass   Activity: Activity Management: walk with assistive device and/or staff member - L3  FAS: Feeding/Nutrition   Diet order: Diet/Nutrition Received: 2 gram sodium,   Fluid restriction:    T: Thrombus   DVT prophylaxis: VTE Required Core Measure: Per order contraindicated for SCDs/Anticoagulants  H: HOB Elevation   Head of Bed (HOB): other (see comments)(up in chair)  U: Ulcer Prophylaxis   GI: yes  G: Glucose control   managed Glycemic Management: blood glucose monitoring, oral hydration promoted  S: Skin   Bundle compliance: yes   Bathing/Skin Care: incontinence care Date: 12/14/20  B: Bowel Function   no issues   I: Indwelling Catheters   Beaver necessity: [REMOVED]      Urethral Catheter 12/13/20 1906 Latex;Straight-tip 16 Fr.-Reason for Continuing Urinary Catheterization: Critically ill in ICU and requiring hourly monitoring of intake/output   CVC necessity: Yes   IPAD offered: Yes  D: De-escalation Antibx   Yes  Plan for the day   Stress test/ cardio work-up, discharge home  Family/Goals of care/Code Status   Code Status: Full Code     No acute events throughout day, VS and assessment per flow sheet, patient progressing towards goals as tolerated, plan of care reviewed with Korey Aguero and family, all concerns addressed, will continue to monitor.

## 2020-12-15 NOTE — ASSESSMENT & PLAN NOTE
Patient takes amlodipine 5 mg.  Upon arrival to [facility] [place] [facility] blood pressure was when the 150s/80s while on Versed.  However previous to that at the outside facility there were reports that the patient was requiring Levophed.    PLAN:  -- Increase Amlodipine to 10 mg QD  -- Will add additional antihypertensives as warranted  -- goal blood pressure <130/80

## 2020-12-15 NOTE — DISCHARGE SUMMARY
[facility]  Cardiology  Discharge Summary      Patient Name: Korey Aguero  MRN: [id number]  Admission Date: 12/13/2020  Hospital Length of Stay: 2 days  Discharge Date and Time:  12/15/2020 3:55 PM  Attending Physician: Ab Valdez MD    Discharging Provider: Trini Eaton DO  Primary Care Physician: Nader Valiente MD    HPI:   Mr. Aguero 60-year-old male with history of hypertension. He is being transferred from [place] after suspected cardiac arrest. Records from OSH show that patient was walking to his tractor when he apparently felt short of breath with chest discomfort and went to sit down and called for his wife. When she drove up to check on him she found him sitting in the  seat minimally responsive answering questions in only 1 or 2 words. EMS was called and when he reached the ED he was unresponsive to all stimuli. However, it is not clear per the records that the patient arrested or that he was coded. He does not have any known history of heart disease or stroke. Has had no head trauma. He was given Narcan in route without any significant changes. Patient was also hypotensive and bradycardic and was given atropine that helped his pulse slightly. SBP was in low 90s upon arriving to the ED. In the ED, patient was given an additional dose of Narcan 1 mg without any change. Remained unresponsive was concern for airway protection and was intubated. Patient remained borderline on his blood pressure and was given IV fluids. Central line was placed in ED for fluids and Levophed administration. Patient's had blood cultures drawn and was started Maxipime at OSH. Because of the chest pain and altered mental status it was presumed that the patient had a cardiac arrhythmia/cardiac event and was elected to transfer [place] [facility] Before transfer sedation changed from a propofol to Versed. It was reported that the patient was more alert and required sedation to keep him from pulling  out lines and tubes.    Labs at OSH: Covid negative, ammonia level is normal at 45, urine drug screenIs negative, cath UA shows 2-5 WBCs  RBCs few bacteria. ABGs pH is 7.37 PCO2 of 46 PO2 64, WBC 11,800, hemoglobin 14.2 and hematocrit of 42.8. BMP showed a glucose 150 otherwise normal CPKs 97 CK-MB 1.76 troponin less than 0.01 lactic acid 3. CT head showed no acute intracranial process. Chest x-ray was pertinent for an enlarged heart with possible left lower lobe pneumonia.EKG  sinus rate is 61 left axis deviation. Repeat lactate has increased to 3.5. After intubation, Patient's repeat ABGs on 100% FiO2 showed pH is 7.25 PCO2 of 58 and PO2 of 57 and his rate was increased to account for hypercapnia    Upon Arrival to [facility] he is following commands appropriately. He is hemodynamically stable without arrhythmia present on tele. He was hypertensive with 154/76 on versed with a HR of 100.     * No surgery found *     Indwelling Lines/Drains at time of discharge:  Lines/Drains/Airways     Central Venous Catheter Line            Percutaneous Central Line Insertion/Assessment - Triple Lumen  12/13/20 1909 left internal jugular 1 day                Hospital Course:  60-year-old male transferred for suspected cardiac arrest after being found unresponsive upon admittance to the ED. Troponin negative EKG at outside facility showed no acute abnormalities. D-dimer negative, patient was intubated for airway protection.  CT head negative for acute abnormality the outside facility.  Patient appears to be septic secondary to community-acquired pneumonia. Liberated from the ventilator 12/14. TTE unremarkable. Patient remains asymptomatic from a cardiac stand point. Pharm stress test showed no ischemia. Patient should follow up with his PCP in one week. He was discharged home with no needs.    Consults:   Consults (From admission, onward)        Status Ordering Provider     Inpatient consult to Registered Dietitian/Nutritionist   Once     Provider:  (Not yet assigned)    Completed WILMAN ANGEL          Significant Diagnostic Studies: Labs:   CMP   Recent Labs   Lab 12/13/20  1620 12/14/20  0332 12/15/20  0339 12/15/20  1406    138 142  --    K 4.2 3.5 3.4* 3.9    104 107  --    CO2 20* 21* 28  --    * 101 97  --    BUN 7 8 10  --    CREATININE 0.7 0.8 0.9  --    CALCIUM 8.0* 8.0* 8.4*  --    PROT 6.7 6.0 6.1  --    ALBUMIN 3.7 3.2* 3.0*  --    BILITOT 0.4 0.4 0.7  --    ALKPHOS 75 70 65  --    AST 22 20 18  --    ALT 28 21 17  --    ANIONGAP 14 13 7*  --    ESTGFRAFRICA >60.0 >60.0 >60.0  --    EGFRNONAA >60.0 >60.0 >60.0  --    , CBC   Recent Labs   Lab 12/13/20  1620 12/14/20  0332 12/15/20  0339   WBC 16.00* 12.32 10.63   HGB 13.8* 12.8* 12.5*   HCT 42.9 39.1* 38.9*    235 208   , INR   Lab Results   Component Value Date    INR 1.0 12/13/2020   , Troponin   Recent Labs   Lab 12/14/20  0030 12/14/20  0332 12/14/20  0934   TROPONINI 0.011 0.015 0.009    and All labs within the past 24 hours have been reviewed    Pending Diagnostic Studies:     Procedure Component Value Units Date/Time    NM Myocardial Perfusion Spect Multi Pharmacologic [[id number]] Resulted: 12/15/20 1308    Order Status: Sent Lab Status: In process Updated: 12/15/20 1309          Final Active Diagnoses:    Diagnosis Date Noted POA    PRINCIPAL PROBLEM:  Acute respiratory failure with hypoxia and hypercapnia [J96.01, J96.02] 12/14/2020 Yes    Community acquired pneumonia [J18.9] 12/13/2020 Yes    Essential hypertension [I10] 12/13/2020 Yes      Problems Resolved During this Admission:    Diagnosis Date Noted Date Resolved POA    Cardiac arrest [I46.9] 12/13/2020 12/13/2020 Yes    Chest pain [R07.9] 12/13/2020 12/15/2020 Yes    Sepsis [A41.9] 12/13/2020 12/15/2020 Yes    Acute hypoxemic respiratory failure [J96.01] 12/13/2020 12/14/2020 Yes    On mechanically assisted ventilation [Z99.11] 12/13/2020 12/15/2020 Not Applicable    Serum  ammonia increased [E72.20] 12/13/2020 12/15/2020 Yes     No new Assessment & Plan notes have been filed under this hospital service since the last note was generated.  Service: Cardiology      Discharged Condition: stable    Disposition: Home or Self Care    Follow Up:  Follow-up Information     Nader Valiente MD In 1 week.    Specialty: Family Medicine  Why: hospital follow up  Contact information:  [address]  [address]  [place] [address]  [phone]                 Patient Instructions:      Diet Adult Regular     Notify your health care provider if you experience any of the following:  temperature >100.4     Notify your health care provider if you experience any of the following:  persistent nausea and vomiting or diarrhea     Notify your health care provider if you experience any of the following:  severe uncontrolled pain     Notify your health care provider if you experience any of the following:  redness, tenderness, or signs of infection (pain, swelling, redness, odor or green/yellow discharge around incision site)     Notify your health care provider if you experience any of the following:  difficulty breathing or increased cough     Notify your health care provider if you experience any of the following:  severe persistent headache     Notify your health care provider if you experience any of the following:  worsening rash     Notify your health care provider if you experience any of the following:  persistent dizziness, light-headedness, or visual disturbances     Notify your health care provider if you experience any of the following:  increased confusion or weakness     Activity as tolerated     Medications:  Reconciled Home Medications:      Medication List      START taking these medications    amLODIPine 10 MG tablet  Commonly known as: NORVASC  Take 1 tablet (10 mg total) by mouth once daily.     cefpodoxime 200 MG tablet  Commonly known as: VANTIN  Take 1 tablet  (200 mg total) by mouth every 12 (twelve) hours. for 2 days  Start taking on: December 16, 2020        CONTINUE taking these medications    apple cider vinegar 300 mg Tab  Take by mouth.     ketoconazole 2 % cream  Commonly known as: NIZORAL  Apply topically once daily.     KRILL -94-13-50 mg Cap  Generic drug: krill-omega-3-dha-epa-lipids  Take by mouth.        STOP taking these medications    UNABLE TO FIND            Time spent on the discharge of patient: 35 minutes    Trini Eaton DO  Cardiology  [facility]

## 2020-12-15 NOTE — ASSESSMENT & PLAN NOTE
Outside chest x-ray was suggestive for left lower lobe pneumonia and/or possible aspiration pneumonia patient has a leukocytosis of 11,000 with an elevated lactic acidosis at 3.5 in the outside facility.  No recent hospitalizations or sick contacts that were noted.      PLAN:  -- will treat for CAP coverage with azithromycin and ceftriaxone  -- blood and sputum cultures no growth today  -- patient was COVID Negative at outside facility

## 2020-12-15 NOTE — ASSESSMENT & PLAN NOTE
Patient transferred from [place] with concerns for cardiac arrest.  Patient was temporarily unresponsive at outside facility.  However, there was no documentation reporting that patient was coded. Patient was moving all extremities and following commands upon arrival at [facility]. Troponins at outside facility are flat and less than 0.01.  No EKG changes were noted at the outside facility during this time.  Patient does not have a history of heart disease or stroke.  CT head at outside facility showed no acute ischemic changes.  Patient was intubated at outside facility for airway watch and remains intubated here.  Initially upon transfer patient was not adequately sedated and expressed to nursing staff that he was having chest pain.  As patient was intubated he was not able to appropriately describe the pain.     Differential includes PE, ACS, pericarditis, pleuritic chest pain secondary to found pneumonia and possible aspiration pneumonia    Following studies showed flat Troponins, D-dimer only minimally elevated, echocardiogram unremarkable    PLAN:  -- Continue telemetry to assess for underlying arrhythmias  -- treat patient's underlying pneumonia with cap coverage (CTX and azithromycin)  -- patient to undergo a nuclear stress test as apart of ischaemic work up as do not have a clear cause for patient's described arrest at the OS

## 2020-12-15 NOTE — ASSESSMENT & PLAN NOTE
Unclear if patient is an active drinker.  With no known history of liver failure. However, outside facility ammonia level was at 45.  Tox screen at outside facility was negative  LFTs wnl - continue to monitor  salicylates, Tylenol, ethanol level - negative  limited right upper quadrant abdominal ultrasound showed hepatic steatosis without acute cholecystitis    PLAN:  -- Monitor CMP for changes

## 2020-12-15 NOTE — PLAN OF CARE
Extubated to room air this morning. VSS. Pt AAOx4 with no current complaints. Bedside swallow eval completed and cardiac diet ordered. Tolerating well. Plan to do inpatient stress test and ischemic workup before discharge. Mr. Aguero and wife, Coby, updated on POC and verbalize  understanding. See flowsheets for assessment data and vitals  CINDY POWERS RN 12/14/2020 6:07 PM

## 2020-12-15 NOTE — PT/OT/SLP PROGRESS
Speech Language Pathology      Korey Aguero  MRN: [id number]    SLP Swallow Evaluation orders received and reviewed with RN. Patient not seen today secondary to unavailable upon ST attempts ([facility] for stress test, ECHO.) Upon later attempt, Patient receiving discharge instructions.     NIKHIL Hernandez., [facility]-SLP  Speech-Language Pathology  Pager: [phone]  12/15/2020

## 2020-12-15 NOTE — NURSING
Reviewed discharge instructions with patient.  Patient verbalized understanding of all instructions given.  Allowed patient time to ask questions with all questions answered.  Awaiting prescriptions for pharmacy.  Will continue to monitor.

## 2020-12-15 NOTE — DISCHARGE INSTRUCTIONS
Reviewed discharge instructions with patient and family.  Allowed time to ask/answer questions. Patient and family verbalize understanding of instructions given.

## 2020-12-15 NOTE — SUBJECTIVE & OBJECTIVE
Interval History: Liberated from the vent 12/14 without issue. Patient has remained asymptomatic from a cardiac stand point. However, due to questionable cardiac arrest at OSH will do a nuclear stress test.    Review of Systems   Constitution: Negative for chills, decreased appetite and fever.   Eyes: Negative for double vision and pain.   Cardiovascular: Negative for chest pain and palpitations.   Respiratory: Negative for cough and shortness of breath.    Musculoskeletal: Negative for muscle weakness.   Gastrointestinal: Negative for bloating and abdominal pain.   Genitourinary: Negative for flank pain.   Neurological: Negative for headaches, light-headedness and weakness.   Psychiatric/Behavioral: Negative for hallucinations. The patient is not nervous/anxious.      Objective:     Vital Signs (Most Recent):  Temp: 99.3 °F (37.4 °C) (12/15/20 0300)  Pulse: (!) 56 (12/15/20 0500)  Resp: 17 (12/15/20 0500)  BP: (!) 146/66 (12/15/20 0500)  SpO2: 98 % (12/15/20 0500) Vital Signs (24h Range):  Temp:  [98.6 °F (37 °C)-99.5 °F (37.5 °C)] 99.3 °F (37.4 °C)  Pulse:  [56-96] 56  Resp:  [0-33] 17  SpO2:  [93 %-100 %] 98 %  BP: (119-171)/() 146/66     Weight: 106.8 kg (235 lb 6.4 oz)  Body mass index is 34.26 kg/m².     SpO2: 98 %  O2 Device (Oxygen Therapy): nasal cannula      Intake/Output Summary (Last 24 hours) at 12/15/2020 0556  Last data filed at 12/15/2020 0500  Gross per 24 hour   Intake 749 ml   Output 2100 ml   Net -1351 ml       Lines/Drains/Airways     Central Venous Catheter Line            Percutaneous Central Line Insertion/Assessment - Triple Lumen  12/13/20 1909 left internal jugular 1 day          Peripheral Intravenous Line                 Peripheral IV - Single Lumen 12/13/20 1905 18 G Left;Posterior Hand 1 day                Physical Exam   Constitutional: He is oriented to person, place, and time. He appears well-developed and well-nourished. No distress.   HENT:   Head: Normocephalic and  atraumatic.   Eyes: EOM are normal.   Neck: Normal range of motion. Neck supple. No JVD present.   Cardiovascular: Normal rate, regular rhythm and intact distal pulses.   Pulmonary/Chest: Effort normal. He has no rales.   Abdominal: Soft. Bowel sounds are normal. He exhibits no distension.   Musculoskeletal:         General: No edema.   Neurological: He is alert and oriented to person, place, and time.   Skin: Skin is warm and dry. He is not diaphoretic. No erythema.       Significant Labs:   ABG:   Recent Labs   Lab 12/13/20  1640 12/14/20  0724   PH 7.351 7.456*   PCO2 45.7* 37.0   HCO3 25.3 26.1   POCSATURATED 98 96   BE 0 2   , Blood Culture:   Recent Labs   Lab 12/13/20  1653 12/13/20  1753   LABBLOO No Growth to date  No Growth to date No Growth to date   , CMP   Recent Labs   Lab 12/13/20  1620 12/14/20  0332 12/15/20  0339    138 142   K 4.2 3.5 3.4*    104 107   CO2 20* 21* 28   * 101 97   BUN 7 8 10   CREATININE 0.7 0.8 0.9   CALCIUM 8.0* 8.0* 8.4*   PROT 6.7 6.0 6.1   ALBUMIN 3.7 3.2* 3.0*   BILITOT 0.4 0.4 0.7   ALKPHOS 75 70 65   AST 22 20 18   ALT 28 21 17   ANIONGAP 14 13 7*   ESTGFRAFRICA >60.0 >60.0 >60.0   EGFRNONAA >60.0 >60.0 >60.0   , CBC   Recent Labs   Lab 12/13/20  1620 12/14/20  0332 12/15/20  0339   WBC 16.00* 12.32 10.63   HGB 13.8* 12.8* 12.5*   HCT 42.9 39.1* 38.9*    235 208   , INR   Recent Labs   Lab 12/13/20  1750   INR 1.0   , Lipid Panel   Recent Labs   Lab 12/13/20  1620   CHOL 183   HDL 39*   LDLCALC 107.6   TRIG 182*   CHOLHDL 21.3   , Troponin   Recent Labs   Lab 12/14/20  0030 12/14/20  0332 12/14/20  0934   TROPONINI 0.011 0.015 0.009    and All pertinent lab results from the last 24 hours have been reviewed.    Significant Imaging: X-Ray: CXR: X-Ray Chest PA and Lateral (CXR): 12/13/20 Endotracheal tube tip lies 4.6 cm above the ara.  Nasogastric tube courses below the diaphragm, beyond the field of view.  Left central venous catheter tip  projects over the distal SVC.  There is a suspected hiatal hernia.  The cardiomediastinal silhouette is not enlarged noting tortuosity of the aorta..  There is no pleural effusion.  The trachea is midline.  The lungs are symmetrically expanded bilaterally with mildly coarse central hilar interstitial attenuation.  Increased parenchymal attenuation projected along the medial aspect of the right lower lung zone is likely sequela of rotation rather than acute process although follow-up recommended.  There is no pneumothorax.  The osseous structures are remarkable for degenerative changes.

## 2020-12-16 ENCOUNTER — PATIENT OUTREACH (OUTPATIENT)
Dept: ADMINISTRATIVE | Facility: CLINIC | Age: 60
End: 2020-12-16

## 2020-12-16 LAB
BACTERIA SPEC AEROBE CULT: NORMAL
BACTERIA SPEC AEROBE CULT: NORMAL
GRAM STN SPEC: NORMAL

## 2020-12-16 NOTE — PATIENT INSTRUCTIONS
Treating Pneumonia  Pneumonia is an infection of one or both of the lungs. Pneumonia:  · Is usually caused by either a virus or a bacteria  · Can be very serious, especially in infants, young children, and older adults. Its also serious for those with other long-term health problems or weakened immune systems.  · Is sometimes treated at home and sometimes in the hospital    Antibiotic medicines  Antibiotics may be prescribed for pneumonia caused by bacteria. They may be pills (oral medicines), or shots (injections). Or they may be given by IV (intravenously) into a vein. If you are taking oral medicines at home:  · Fill your prescription and start taking your medicine as soon as you can.  · You will likely start to feel better in a day or 2, but dont stop taking the antibiotic.  · Use a pill organizer to help you remember to take your medicine.  · Let your healthcare provider know if you have side effects.  · Take your medicine exactly as directed on the label. Talk to your provider or pharmacist if you have any questions.  Antiviral medicines  Antiviral medicine may be prescribed for pneumonia caused by a virus. For example, antiviral medicine may be prescribed for pneumonia caused by the flu virus. Antibiotics do not work against viruses. If you are taking antiviral medicine at home:  · Fill your prescription and start taking your medicine as soon as you can.  · Talk with your provider or pharmacist about possible side effects.  · Take the medicine exactly as instructed.  To relieve symptoms  There are many medicines that can help relieve symptoms of pneumonia. Some are prescription and some are over-the-counter.  Your healthcare provider may recommend:  · Acetaminophen or ibuprofen to lower your fever and to lessen headache or other pain  · Cough medicine to loosen mucus or to reduce coughing  Make sure you check with your healthcare provider or pharmacist before taking any over-the-counter  medicines.  Special treatments  If you are hospitalized for pneumonia, you may have other therapies, including:  · Inhaled medicines to help with breathing or chest congestion  · Supplemental oxygen to increase low oxygen levels  Drink fluids and eat healthy  You should eat healthy to help your body fight the infection. Drinking a lot of fluids helps to replace fluids lost from fever and to loosen mucus in your chest.  · Diet. Make healthy food choices, including fruits and vegetables, lean meats and other proteins, 100% whole grain and low- or no-fat dairy products.  · Fluids. Drink at least 6 to 8 tall glasses a day. Water and 100% fruit or vegetable juice are best.  Get plenty of rest and sleep  You may be more tired than usual for a while. It is important to get enough sleep at night. Its also important to rest during the day. Talk with your healthcare provider if coughing or other symptoms are interfering with your sleep.  Preventing the spread of germs  The best thing you can do to prevent spreading germs is to wash your hands often. You should:  · Rub your hand with soap and water for 20 to 30 seconds.  · Clean in between your fingers, the backs of your hands, and around your nails.  · Dry your hands on a separate towel or use paper towels.  You should also:  · Keep alcohol-based hand  nearby.  · Make sure you also clean surfaces that you touch. Use a product that kills all types of germs.  · Stay away from others until you are feeling better.  When to call your healthcare provider  Call your healthcare provider if you have any of the following:  · Symptoms get worse  · Fever continues  · Shortness of breath gets worse  · Increased mucus or mucus that is darker in color  · Coughing gets worse  · Lips or fingers are bluish in color  · Side effects from your medicine   Date Last Reviewed: 12/1/2016  © 8022-0889 [employer]. [address] [place] [address], [place] [address]. All rights reserved.  This information is not intended as a substitute for professional medical care. Always follow your healthcare professional's instructions.

## 2020-12-16 NOTE — PLAN OF CARE
Patient discharged home with no needs.  Family available for transportation.  Information given to him per medical staff for f/u and symptoms to notify provider.      Nader Valiente MD  In 1 week  hospital follow up  [address]  [address]  [facility] [place] [address]  [phone]          12/16/20 0857   Final Note   Assessment Type Final Discharge Note   Anticipated Discharge Disposition Home   Hospital Follow Up  Appt(s) scheduled? Yes   Discharge plans and expectations educations in teach back method with documentation complete? Yes   Right Care Referral Info   Post Acute Recommendation No Care   Post-Acute Status   Post-Acute Authorization Other   Other Status No Post-Acute Service Needs   Discharge Delays None known at this time     Leslie Huitron, RN, BSN  Case Management  Ext [phone]

## 2020-12-16 NOTE — PHYSICIAN QUERY
PT Name: Korey Aguero  MR #: [id number]     Diagnosis Clarification      CDS/: Viri Man               Contact information:[email or web address]    This form is a permanent document in the medical record.     Query Date: December 16, 2020    Dear Provider,  By submitting this query, we are merely seeking further clarification of documentation.  Please utilize your independent clinical judgment when addressing the question(s) below.     The medical record contains the following:    Supporting Clinical Information Location in Medical Record   Cardiac arrest     Mr. Aguero 60-year-old male with history of hypertension. He is being transferred from [place] after suspected cardiac arrest. Records from OSH show that patient was walking to his tractor when he apparently felt short of breath with chest discomfort and went to sit down and called for his wife. When she drove up to check on him she found him sitting in the  seat minimally responsive answering questions in only 1 or 2 words. EMS was called and when he reached the ED he was unresponsive to all stimuli. However, it is not clear per the records that the patient arrested or that he was coded  2  Because of the chest pain and altered mental status it was presumed that the patient had a cardiac arrhythmia/cardiac event and was elected to transfer to [facility].    [facility] he is following commands appropriately. He is hemodynamically stable without arrhythmia present on tele      However, due to questionable cardiac arrest at OSH will do a nuclear stress test.   Discharge summary     H&P                                      Cardiology note 12-15     Please clarify if the Cardiac arrest diagnosis has been:    [  ] Ruled In   [  ] Ruled Out   [  ] Other/Clarification of findings (please specify)_______________    [ x  ] Clinically undetermined - insufficient documentation from referring hospital           Please document  in your progress notes daily for the duration of treatment, until resolved, and include in your discharge summary.

## 2020-12-18 LAB — BACTERIA BLD CULT: NORMAL

## 2020-12-19 LAB — BACTERIA BLD CULT: NORMAL

## 2021-01-19 ENCOUNTER — TELEPHONE (OUTPATIENT)
Dept: FAMILY MEDICINE | Facility: CLINIC | Age: 61
End: 2021-01-19

## 2021-01-19 DIAGNOSIS — K59.00 CONSTIPATION, UNSPECIFIED CONSTIPATION TYPE: Primary | ICD-10-CM

## 2021-02-10 ENCOUNTER — OFFICE VISIT (OUTPATIENT)
Dept: FAMILY MEDICINE | Facility: CLINIC | Age: 61
End: 2021-02-10
Payer: COMMERCIAL

## 2021-02-10 VITALS
HEIGHT: 69 IN | SYSTOLIC BLOOD PRESSURE: 120 MMHG | HEART RATE: 72 BPM | DIASTOLIC BLOOD PRESSURE: 80 MMHG | WEIGHT: 240 LBS | BODY MASS INDEX: 35.55 KG/M2

## 2021-02-10 DIAGNOSIS — M79.605 LEFT LEG PAIN: Primary | ICD-10-CM

## 2021-02-10 DIAGNOSIS — J30.9 ALLERGIC RHINITIS, UNSPECIFIED SEASONALITY, UNSPECIFIED TRIGGER: ICD-10-CM

## 2021-02-10 DIAGNOSIS — Z86.010 HX OF COLONIC POLYPS: ICD-10-CM

## 2021-02-10 DIAGNOSIS — R19.8 PAIN ASSOCIATED WITH DEFECATION: ICD-10-CM

## 2021-02-10 DIAGNOSIS — I10 HTN (HYPERTENSION), BENIGN: ICD-10-CM

## 2021-02-10 PROCEDURE — 3074F SYST BP LT 130 MM HG: CPT | Mod: S$GLB,,, | Performed by: FAMILY MEDICINE

## 2021-02-10 PROCEDURE — 3079F PR MOST RECENT DIASTOLIC BLOOD PRESSURE 80-89 MM HG: ICD-10-PCS | Mod: S$GLB,,, | Performed by: FAMILY MEDICINE

## 2021-02-10 PROCEDURE — 99214 OFFICE O/P EST MOD 30 MIN: CPT | Mod: S$GLB,,, | Performed by: FAMILY MEDICINE

## 2021-02-10 PROCEDURE — 3008F PR BODY MASS INDEX (BMI) DOCUMENTED: ICD-10-PCS | Mod: S$GLB,,, | Performed by: FAMILY MEDICINE

## 2021-02-10 PROCEDURE — 3074F PR MOST RECENT SYSTOLIC BLOOD PRESSURE < 130 MM HG: ICD-10-PCS | Mod: S$GLB,,, | Performed by: FAMILY MEDICINE

## 2021-02-10 PROCEDURE — 99214 PR OFFICE/OUTPT VISIT, EST, LEVL IV, 30-39 MIN: ICD-10-PCS | Mod: S$GLB,,, | Performed by: FAMILY MEDICINE

## 2021-02-10 PROCEDURE — 3079F DIAST BP 80-89 MM HG: CPT | Mod: S$GLB,,, | Performed by: FAMILY MEDICINE

## 2021-02-10 PROCEDURE — 3008F BODY MASS INDEX DOCD: CPT | Mod: S$GLB,,, | Performed by: FAMILY MEDICINE

## 2021-02-15 RX ORDER — GABAPENTIN 100 MG/1
100 CAPSULE ORAL 3 TIMES DAILY
Qty: 90 CAPSULE | Refills: 1 | Status: SHIPPED | OUTPATIENT
Start: 2021-02-15 | End: 2021-10-14

## 2021-02-22 ENCOUNTER — HOSPITAL ENCOUNTER (OUTPATIENT)
Dept: RADIOLOGY | Facility: HOSPITAL | Age: 61
Discharge: HOME | End: 2021-02-22
Attending: FAMILY MEDICINE
Payer: COMMERCIAL

## 2021-02-22 DIAGNOSIS — M79.605 LEFT LEG PAIN: ICD-10-CM

## 2021-02-22 PROCEDURE — 72100 X-RAY EXAM L-S SPINE 2/3 VWS: CPT | Mod: TC,PO

## 2021-03-23 DIAGNOSIS — I10 HTN (HYPERTENSION), BENIGN: ICD-10-CM

## 2021-03-23 RX ORDER — AMLODIPINE BESYLATE 10 MG/1
10 TABLET ORAL DAILY
Qty: 90 TABLET | Refills: 1 | Status: SHIPPED | OUTPATIENT
Start: 2021-03-23 | End: 2021-09-27 | Stop reason: SDUPTHER

## 2021-07-30 ENCOUNTER — TELEPHONE (OUTPATIENT)
Dept: FAMILY MEDICINE | Facility: CLINIC | Age: 61
End: 2021-07-30

## 2021-09-27 DIAGNOSIS — I10 HTN (HYPERTENSION), BENIGN: ICD-10-CM

## 2021-09-27 RX ORDER — AMLODIPINE BESYLATE 10 MG/1
10 TABLET ORAL DAILY
Qty: 90 TABLET | Refills: 1 | Status: SHIPPED | OUTPATIENT
Start: 2021-09-27 | End: 2022-03-29 | Stop reason: SDUPTHER

## 2021-10-14 ENCOUNTER — OFFICE VISIT (OUTPATIENT)
Dept: FAMILY MEDICINE | Facility: CLINIC | Age: 61
End: 2021-10-14
Payer: COMMERCIAL

## 2021-10-14 VITALS
WEIGHT: 239 LBS | HEIGHT: 69 IN | SYSTOLIC BLOOD PRESSURE: 138 MMHG | BODY MASS INDEX: 35.4 KG/M2 | DIASTOLIC BLOOD PRESSURE: 86 MMHG | HEART RATE: 63 BPM

## 2021-10-14 DIAGNOSIS — Z13.6 SCREENING FOR ISCHEMIC HEART DISEASE (IHD): ICD-10-CM

## 2021-10-14 DIAGNOSIS — L30.9 DERMATITIS: ICD-10-CM

## 2021-10-14 DIAGNOSIS — I10 HTN (HYPERTENSION), BENIGN: Primary | ICD-10-CM

## 2021-10-14 DIAGNOSIS — Z79.899 LONG-TERM USE OF HIGH-RISK MEDICATION: ICD-10-CM

## 2021-10-14 DIAGNOSIS — K21.9 GASTROESOPHAGEAL REFLUX DISEASE WITHOUT ESOPHAGITIS: ICD-10-CM

## 2021-10-14 DIAGNOSIS — Z13.29 SCREENING FOR ENDOCRINE DISORDER: ICD-10-CM

## 2021-10-14 DIAGNOSIS — Z13.89 SCREENING FOR BLOOD OR PROTEIN IN URINE: ICD-10-CM

## 2021-10-14 PROCEDURE — 99214 PR OFFICE/OUTPT VISIT, EST, LEVL IV, 30-39 MIN: ICD-10-PCS | Mod: S$GLB,,, | Performed by: FAMILY MEDICINE

## 2021-10-14 PROCEDURE — 1160F RVW MEDS BY RX/DR IN RCRD: CPT | Mod: S$GLB,,, | Performed by: FAMILY MEDICINE

## 2021-10-14 PROCEDURE — 3008F BODY MASS INDEX DOCD: CPT | Mod: S$GLB,,, | Performed by: FAMILY MEDICINE

## 2021-10-14 PROCEDURE — 3079F DIAST BP 80-89 MM HG: CPT | Mod: S$GLB,,, | Performed by: FAMILY MEDICINE

## 2021-10-14 PROCEDURE — 3008F PR BODY MASS INDEX (BMI) DOCUMENTED: ICD-10-PCS | Mod: S$GLB,,, | Performed by: FAMILY MEDICINE

## 2021-10-14 PROCEDURE — 99214 OFFICE O/P EST MOD 30 MIN: CPT | Mod: S$GLB,,, | Performed by: FAMILY MEDICINE

## 2021-10-14 PROCEDURE — 3075F PR MOST RECENT SYSTOLIC BLOOD PRESS GE 130-139MM HG: ICD-10-PCS | Mod: S$GLB,,, | Performed by: FAMILY MEDICINE

## 2021-10-14 PROCEDURE — 1160F PR REVIEW ALL MEDS BY PRESCRIBER/CLIN PHARMACIST DOCUMENTED: ICD-10-PCS | Mod: S$GLB,,, | Performed by: FAMILY MEDICINE

## 2021-10-14 PROCEDURE — 3075F SYST BP GE 130 - 139MM HG: CPT | Mod: S$GLB,,, | Performed by: FAMILY MEDICINE

## 2021-10-14 PROCEDURE — 3079F PR MOST RECENT DIASTOLIC BLOOD PRESSURE 80-89 MM HG: ICD-10-PCS | Mod: S$GLB,,, | Performed by: FAMILY MEDICINE

## 2021-10-14 RX ORDER — CHOLECALCIFEROL (VITAMIN D3) 25 MCG
1000 TABLET ORAL DAILY
COMMUNITY

## 2021-10-14 RX ORDER — HYDROGEN PEROXIDE 3 %
20 SOLUTION, NON-ORAL MISCELLANEOUS
COMMUNITY

## 2022-03-29 DIAGNOSIS — I10 HTN (HYPERTENSION), BENIGN: ICD-10-CM

## 2022-03-29 RX ORDER — AMLODIPINE BESYLATE 10 MG/1
10 TABLET ORAL DAILY
Qty: 90 TABLET | Refills: 1 | Status: SHIPPED | OUTPATIENT
Start: 2022-03-29 | End: 2022-09-29 | Stop reason: SDUPTHER

## 2022-03-29 NOTE — TELEPHONE ENCOUNTER
The patient's prescription has been approved and sent to   [id number] - [place], [place] - [address] AT [facility] [address]  [address]  [place] [address]  Phone: [phone] Fax: [phone]

## 2022-03-29 NOTE — TELEPHONE ENCOUNTER
----- Message from Medina Kline sent at 3/29/2022 10:17 AM CDT -----  Pt calling for a refill on Amlodipine send to [facility] [place]  [phone]

## 2022-03-31 ENCOUNTER — TELEPHONE (OUTPATIENT)
Dept: FAMILY MEDICINE | Facility: CLINIC | Age: 62
End: 2022-03-31
Payer: COMMERCIAL

## 2022-04-14 ENCOUNTER — OFFICE VISIT (OUTPATIENT)
Dept: FAMILY MEDICINE | Facility: CLINIC | Age: 62
End: 2022-04-14
Payer: COMMERCIAL

## 2022-04-14 VITALS
HEIGHT: 68 IN | WEIGHT: 243 LBS | SYSTOLIC BLOOD PRESSURE: 144 MMHG | DIASTOLIC BLOOD PRESSURE: 78 MMHG | HEART RATE: 68 BPM | BODY MASS INDEX: 36.83 KG/M2

## 2022-04-14 DIAGNOSIS — K21.9 GASTROESOPHAGEAL REFLUX DISEASE WITHOUT ESOPHAGITIS: ICD-10-CM

## 2022-04-14 DIAGNOSIS — I10 HTN (HYPERTENSION), BENIGN: Primary | ICD-10-CM

## 2022-04-14 PROCEDURE — 1160F PR REVIEW ALL MEDS BY PRESCRIBER/CLIN PHARMACIST DOCUMENTED: ICD-10-PCS | Mod: S$GLB,,, | Performed by: FAMILY MEDICINE

## 2022-04-14 PROCEDURE — 99214 OFFICE O/P EST MOD 30 MIN: CPT | Mod: S$GLB,,, | Performed by: FAMILY MEDICINE

## 2022-04-14 PROCEDURE — 3008F PR BODY MASS INDEX (BMI) DOCUMENTED: ICD-10-PCS | Mod: S$GLB,,, | Performed by: FAMILY MEDICINE

## 2022-04-14 PROCEDURE — 3078F DIAST BP <80 MM HG: CPT | Mod: S$GLB,,, | Performed by: FAMILY MEDICINE

## 2022-04-14 PROCEDURE — 3077F PR MOST RECENT SYSTOLIC BLOOD PRESSURE >= 140 MM HG: ICD-10-PCS | Mod: S$GLB,,, | Performed by: FAMILY MEDICINE

## 2022-04-14 PROCEDURE — 3078F PR MOST RECENT DIASTOLIC BLOOD PRESSURE < 80 MM HG: ICD-10-PCS | Mod: S$GLB,,, | Performed by: FAMILY MEDICINE

## 2022-04-14 PROCEDURE — 3008F BODY MASS INDEX DOCD: CPT | Mod: S$GLB,,, | Performed by: FAMILY MEDICINE

## 2022-04-14 PROCEDURE — 99214 PR OFFICE/OUTPT VISIT, EST, LEVL IV, 30-39 MIN: ICD-10-PCS | Mod: S$GLB,,, | Performed by: FAMILY MEDICINE

## 2022-04-14 PROCEDURE — 3077F SYST BP >= 140 MM HG: CPT | Mod: S$GLB,,, | Performed by: FAMILY MEDICINE

## 2022-04-14 PROCEDURE — 1160F RVW MEDS BY RX/DR IN RCRD: CPT | Mod: S$GLB,,, | Performed by: FAMILY MEDICINE

## 2022-04-14 NOTE — PROGRESS NOTES
SUBJECTIVE:    Patient ID: Korey Aguero is a 61 y.o. male.    Chief Complaint: Follow-up (Did not bring bottles// bs)    Pt here to checkup on acute and chronic conditions.      BP is slightly elevated.  Takes BP at home sometimes, and SBP is usually in the 130s., denies CP/SOB. Has gained 4 pounds since last visit.    Right arm rash is not bothering him right now.  Uses topical steroid when needed.     Taking vitamin D.    Taking nexium every day. Not having heartburn.    Has been walking most days. Plans to go back to the gym, to lift weight soon.     -------------------------------------------  christi Rios,  March 2022 ([facility])  Has appt next month, Alejandro.  Having trouble driving at night and having more trouble seeing small things. Wears reading glasses when needed.      No visits with results within 6 Month(s) from this visit.   Latest known visit with results is:   No results displayed because visit has over 200 results.          Past Medical History:   Diagnosis Date    On mechanically assisted ventilation 12/13/2020     Social History     Socioeconomic History    Marital status:    Tobacco Use    Smoking status: Never Smoker    Smokeless tobacco: Never Used     History reviewed. No pertinent surgical history.  History reviewed. No pertinent family history.    Review of patient's allergies indicates:   Allergen Reactions    Adhesive      Band-aide    Pcn [penicillins]        Current Outpatient Medications:     amLODIPine (NORVASC) 10 MG tablet, Take 1 tablet (10 mg total) by mouth once daily., Disp: 90 tablet, Rfl: 1    apple cider vinegar 300 mg Tab, Take by mouth., Disp: , Rfl:     esomeprazole (NEXIUM) 20 MG capsule, Take 20 mg by mouth before breakfast., Disp: , Rfl:     vitamin D (VITAMIN D3) 1000 units Tab, Take 1,000 Units by mouth once daily., Disp: , Rfl:     Review of Systems   Constitutional: Negative for appetite change, fatigue, fever and unexpected weight change.  "  Respiratory: Negative for cough, chest tightness, shortness of breath and wheezing.    Cardiovascular: Negative for chest pain and leg swelling.   Gastrointestinal: Negative for abdominal pain, constipation, nausea and vomiting.        -heartburn   Genitourinary: Negative for decreased urine volume, difficulty urinating, dysuria and frequency.   Musculoskeletal: Negative for arthralgias, back pain, myalgias and neck pain.   Skin: Negative for rash.   Neurological: Negative for dizziness, weakness, numbness and headaches.   Hematological: Does not bruise/bleed easily.   Psychiatric/Behavioral: Negative for dysphoric mood, sleep disturbance and suicidal ideas. The patient is not nervous/anxious.    All other systems reviewed and are negative.         Objective:      Vitals:    04/14/22 0939 04/14/22 1034   BP: (!) 142/82 (!) 144/78   Pulse: 68    Weight: 110.2 kg (243 lb)    Height: 5' 8" (1.727 m)      Wt Readings from Last 3 Encounters:   04/14/22 110.2 kg (243 lb)   10/14/21 108.4 kg (239 lb)   02/10/21 108.9 kg (240 lb)       Physical Exam  Vitals reviewed.   Constitutional:       General: He is not in acute distress.     Appearance: Normal appearance. He is well-developed. He is obese.   HENT:      Head: Normocephalic and atraumatic.   Neck:      Thyroid: No thyromegaly.   Cardiovascular:      Rate and Rhythm: Normal rate and regular rhythm.      Heart sounds: Normal heart sounds. No murmur heard.    No friction rub.   Pulmonary:      Effort: Pulmonary effort is normal.      Breath sounds: Normal breath sounds. No wheezing or rales.   Abdominal:      General: Bowel sounds are normal. There is no distension.      Palpations: Abdomen is soft.      Tenderness: There is no abdominal tenderness.   Musculoskeletal:      Cervical back: Neck supple.   Lymphadenopathy:      Cervical: No cervical adenopathy.   Skin:     General: Skin is warm and dry.      Findings: No rash.   Neurological:      Mental Status: He is alert " and oriented to person, place, and time.      Motor: Motor function is intact.      Coordination: Coordination is intact.      Gait: Gait is intact.   Psychiatric:         Attention and Perception: He is attentive.         Speech: Speech normal.         Behavior: Behavior normal.         Thought Content: Thought content normal.         Judgment: Judgment normal.           Assessment:       1. HTN (hypertension), benign    2. Gastroesophageal reflux disease without esophagitis         Plan:       HTN (hypertension), benign  Comments:  Uncontrolled. Pt to keep BP log and turn in for adjustment.     Gastroesophageal reflux disease without esophagitis  Comments:  Controlled. Will continue to monitor symptoms.    Labs and/or tests have been ordered for the evaluation/monitoring of acute/chronic conditions, to be done just before next visit.    Follow up in about 3 months (around 7/14/2022) for HTN, LABS.        4/14/2022 Nader Valiente

## 2022-04-21 RX ORDER — INDOMETHACIN 75 MG/1
75 CAPSULE, EXTENDED RELEASE ORAL 2 TIMES DAILY
Qty: 30 CAPSULE | Refills: 0 | Status: SHIPPED | OUTPATIENT
Start: 2022-04-21 | End: 2022-07-27

## 2022-04-29 DIAGNOSIS — I10 HTN (HYPERTENSION), BENIGN: Primary | ICD-10-CM

## 2022-04-29 RX ORDER — BENAZEPRIL HYDROCHLORIDE 10 MG/1
10 TABLET ORAL DAILY
Qty: 30 TABLET | Refills: 1 | Status: SHIPPED | OUTPATIENT
Start: 2022-04-29 | End: 2022-06-27 | Stop reason: SDUPTHER

## 2022-04-29 NOTE — PROGRESS NOTES
After hours: pt called in to report that his blood pressure are still reading with elevated systolic levels  . Taking amlodipine 10mg daily. Will sen din new BP med to add to current medication.

## 2022-06-27 DIAGNOSIS — I10 HTN (HYPERTENSION), BENIGN: ICD-10-CM

## 2022-06-27 RX ORDER — BENAZEPRIL HYDROCHLORIDE 10 MG/1
10 TABLET ORAL DAILY
Qty: 90 TABLET | Refills: 1 | Status: SHIPPED | OUTPATIENT
Start: 2022-06-27 | End: 2022-12-15 | Stop reason: SDUPTHER

## 2022-06-27 NOTE — TELEPHONE ENCOUNTER
----- Message from Medina Kline sent at 6/27/2022  3:54 PM CDT -----  Pt calling for refill on Benazepril 10 mg send to [facility] cb # [phone]

## 2022-06-27 NOTE — TELEPHONE ENCOUNTER
The patient's prescription has been approved and sent to   [id number] - [place], [place] - [address] AT [facility] OF [facility] [address]  [address]  [place] [address]  Phone: [phone] Fax: [phone]

## 2022-07-21 DIAGNOSIS — K64.9 HEMORRHOIDS, UNSPECIFIED HEMORRHOID TYPE: Primary | ICD-10-CM

## 2022-07-21 NOTE — PROGRESS NOTES
Pt called after hours requesting prescription for a med he had previously to help with hemorrhoids. Pt instructed to stay hydrated and avoid constipation as that will help. Pt reports that he had a foam last time that helped well. Will send to preferred pharm, which pt notes [facility]

## 2022-07-27 ENCOUNTER — OFFICE VISIT (OUTPATIENT)
Dept: FAMILY MEDICINE | Facility: CLINIC | Age: 62
End: 2022-07-27
Payer: COMMERCIAL

## 2022-07-27 VITALS
HEIGHT: 68 IN | DIASTOLIC BLOOD PRESSURE: 76 MMHG | WEIGHT: 245 LBS | BODY MASS INDEX: 37.13 KG/M2 | SYSTOLIC BLOOD PRESSURE: 132 MMHG | HEART RATE: 66 BPM | OXYGEN SATURATION: 96 %

## 2022-07-27 DIAGNOSIS — I10 HTN (HYPERTENSION), BENIGN: Primary | ICD-10-CM

## 2022-07-27 DIAGNOSIS — K21.9 GASTROESOPHAGEAL REFLUX DISEASE WITHOUT ESOPHAGITIS: ICD-10-CM

## 2022-07-27 DIAGNOSIS — K64.9 HEMORRHOIDS, UNSPECIFIED HEMORRHOID TYPE: ICD-10-CM

## 2022-07-27 PROCEDURE — 4010F ACE/ARB THERAPY RXD/TAKEN: CPT | Mod: CPTII,S$GLB,, | Performed by: FAMILY MEDICINE

## 2022-07-27 PROCEDURE — 1159F PR MEDICATION LIST DOCUMENTED IN MEDICAL RECORD: ICD-10-PCS | Mod: CPTII,S$GLB,, | Performed by: FAMILY MEDICINE

## 2022-07-27 PROCEDURE — 3075F SYST BP GE 130 - 139MM HG: CPT | Mod: CPTII,S$GLB,, | Performed by: FAMILY MEDICINE

## 2022-07-27 PROCEDURE — 3008F PR BODY MASS INDEX (BMI) DOCUMENTED: ICD-10-PCS | Mod: CPTII,S$GLB,, | Performed by: FAMILY MEDICINE

## 2022-07-27 PROCEDURE — 1159F MED LIST DOCD IN RCRD: CPT | Mod: CPTII,S$GLB,, | Performed by: FAMILY MEDICINE

## 2022-07-27 PROCEDURE — 3075F PR MOST RECENT SYSTOLIC BLOOD PRESS GE 130-139MM HG: ICD-10-PCS | Mod: CPTII,S$GLB,, | Performed by: FAMILY MEDICINE

## 2022-07-27 PROCEDURE — 4010F PR ACE/ARB THEARPY RXD/TAKEN: ICD-10-PCS | Mod: CPTII,S$GLB,, | Performed by: FAMILY MEDICINE

## 2022-07-27 PROCEDURE — 3078F DIAST BP <80 MM HG: CPT | Mod: CPTII,S$GLB,, | Performed by: FAMILY MEDICINE

## 2022-07-27 PROCEDURE — 3078F PR MOST RECENT DIASTOLIC BLOOD PRESSURE < 80 MM HG: ICD-10-PCS | Mod: CPTII,S$GLB,, | Performed by: FAMILY MEDICINE

## 2022-07-27 PROCEDURE — 1160F PR REVIEW ALL MEDS BY PRESCRIBER/CLIN PHARMACIST DOCUMENTED: ICD-10-PCS | Mod: CPTII,S$GLB,, | Performed by: FAMILY MEDICINE

## 2022-07-27 PROCEDURE — 1160F RVW MEDS BY RX/DR IN RCRD: CPT | Mod: CPTII,S$GLB,, | Performed by: FAMILY MEDICINE

## 2022-07-27 PROCEDURE — 99214 OFFICE O/P EST MOD 30 MIN: CPT | Mod: S$GLB,,, | Performed by: FAMILY MEDICINE

## 2022-07-27 PROCEDURE — 99214 PR OFFICE/OUTPT VISIT, EST, LEVL IV, 30-39 MIN: ICD-10-PCS | Mod: S$GLB,,, | Performed by: FAMILY MEDICINE

## 2022-07-27 PROCEDURE — 3008F BODY MASS INDEX DOCD: CPT | Mod: CPTII,S$GLB,, | Performed by: FAMILY MEDICINE

## 2022-07-27 NOTE — PROGRESS NOTES
SUBJECTIVE:    Patient ID: Korey Aguero is a 62 y.o. male.    Chief Complaint: 3 month follow up    Pt here to checkup on acute and chronic conditions.      BP is doing ok.  Denies CP/SOB. Has gained 5 pounds since last visit. Has not been walking  Due to hemorrhoid.     Right arm rash is not bothering him right now.  Uses topical steroid when needed.     Taking vitamin D.    Taking nexium every day. Not having heartburn.    Pt had hemorrhoid this last weekend. Was given proctofoam which helped some. Had some leftover medication from Dr. Srivastava nifedipine 2%/lidocaine 1%.    Pt has been taking CBD gummies, to help him relax and to help his joints.  -------------------------------------------  Cscope, hx polyp,  March 2022 (Carola)  Has had eye exam. ([place]champagne)      No visits with results within 6 Month(s) from this visit.   Latest known visit with results is:   No results displayed because visit has over 200 results.          Past Medical History:   Diagnosis Date    On mechanically assisted ventilation 12/13/2020     Social History     Socioeconomic History    Marital status:    Tobacco Use    Smoking status: Never Smoker    Smokeless tobacco: Never Used     History reviewed. No pertinent surgical history.  History reviewed. No pertinent family history.    Review of patient's allergies indicates:   Allergen Reactions    Adhesive      Band-aide    Pcn [penicillins]        Current Outpatient Medications:     amLODIPine (NORVASC) 10 MG tablet, Take 1 tablet (10 mg total) by mouth once daily., Disp: 90 tablet, Rfl: 1    apple cider vinegar 300 mg Tab, Take by mouth., Disp: , Rfl:     benazepriL (LOTENSIN) 10 MG tablet, Take 1 tablet (10 mg total) by mouth once daily., Disp: 90 tablet, Rfl: 1    esomeprazole (NEXIUM) 20 MG capsule, Take 20 mg by mouth before breakfast., Disp: , Rfl:     hydrocortisone-pramoxine (PROCTOFOAM-HS) rectal foam, Place 1 applicator rectally 2 (two) times daily., Disp:  "10 g, Rfl: 1    vitamin D (VITAMIN D3) 1000 units Tab, Take 1,000 Units by mouth once daily., Disp: , Rfl:     Review of Systems   Constitutional: Negative for appetite change, fatigue, fever and unexpected weight change.   Respiratory: Negative for cough, chest tightness, shortness of breath and wheezing.    Cardiovascular: Negative for chest pain and leg swelling.   Gastrointestinal: Negative for abdominal pain, constipation, nausea and vomiting.        -heartburn   Genitourinary: Negative for decreased urine volume, difficulty urinating, dysuria and frequency.   Musculoskeletal: Negative for arthralgias, back pain, myalgias and neck pain.   Skin: Negative for rash.   Neurological: Negative for dizziness, weakness, numbness and headaches.   Hematological: Does not bruise/bleed easily.   Psychiatric/Behavioral: Negative for dysphoric mood, sleep disturbance and suicidal ideas. The patient is not nervous/anxious.    All other systems reviewed and are negative.         Objective:      Vitals:    07/27/22 1507   BP: 132/76   Pulse: 66   SpO2: 96%   Weight: 111.1 kg (245 lb)   Height: 5' 8" (1.727 m)     Wt Readings from Last 3 Encounters:   07/27/22 111.1 kg (245 lb)   04/14/22 110.2 kg (243 lb)   10/14/21 108.4 kg (239 lb)       Physical Exam  Vitals reviewed.   Constitutional:       General: He is not in acute distress.     Appearance: Normal appearance. He is well-developed. He is obese.   HENT:      Head: Normocephalic and atraumatic.   Neck:      Thyroid: No thyromegaly.   Cardiovascular:      Rate and Rhythm: Normal rate and regular rhythm.      Heart sounds: Normal heart sounds. No murmur heard.    No friction rub.   Pulmonary:      Effort: Pulmonary effort is normal.      Breath sounds: Normal breath sounds. No wheezing or rales.   Abdominal:      General: Bowel sounds are normal. There is no distension.      Palpations: Abdomen is soft.      Tenderness: There is no abdominal tenderness.   Musculoskeletal:    "   Cervical back: Neck supple.   Lymphadenopathy:      Cervical: No cervical adenopathy.   Skin:     General: Skin is warm and dry.      Findings: No rash.   Neurological:      Mental Status: He is alert and oriented to person, place, and time.      Motor: Motor function is intact.      Coordination: Coordination is intact.      Gait: Gait is intact.   Psychiatric:         Attention and Perception: He is attentive.         Speech: Speech normal.         Behavior: Behavior normal.         Thought Content: Thought content normal.         Judgment: Judgment normal.           Assessment:       1. HTN (hypertension), benign    2. Gastroesophageal reflux disease without esophagitis    3. Hemorrhoids, unspecified hemorrhoid type         Plan:       HTN (hypertension), benign  Comments:  Controlled. Will continue to monitor BP on current medication regimen    Gastroesophageal reflux disease without esophagitis  Comments:  Controlled. Will continue to monitor symptoms on nexium    Hemorrhoids, unspecified hemorrhoid type  Comments:  Resolving. To continue current medication regimen    Labs and/or tests have been ordered for the evaluation/monitoring of acute/chronic conditions, to be done just before next visit.    Follow up in about 6 months (around 1/27/2023) for HTN, GERD, hemorrhoid, LABS.        7/27/2022 Nader Valiente

## 2022-08-08 ENCOUNTER — TELEPHONE (OUTPATIENT)
Dept: FAMILY MEDICINE | Facility: CLINIC | Age: 62
End: 2022-08-08

## 2022-08-08 DIAGNOSIS — K64.9 HEMORRHOIDS, UNSPECIFIED HEMORRHOID TYPE: ICD-10-CM

## 2022-08-08 NOTE — TELEPHONE ENCOUNTER
----- Message from Ainsley Murcia sent at 8/8/2022  9:51 AM CDT -----  Gary calling from [facility] in [place]. He needs clarification on Proctofoam. They only make it in  the  [facility] or [facility]. Pharmacy [phone] [facility]

## 2022-09-29 DIAGNOSIS — I10 HTN (HYPERTENSION), BENIGN: ICD-10-CM

## 2022-09-29 RX ORDER — AMLODIPINE BESYLATE 10 MG/1
10 TABLET ORAL DAILY
Qty: 90 TABLET | Refills: 1 | Status: SHIPPED | OUTPATIENT
Start: 2022-09-29 | End: 2023-03-22 | Stop reason: SDUPTHER

## 2022-09-29 NOTE — TELEPHONE ENCOUNTER
----- Message from Samantha Das MA sent at 9/29/2022  9:29 AM CDT -----  Pt is calling for a refill on his amlodipine 10mg. # [phone]

## 2022-09-29 NOTE — TELEPHONE ENCOUNTER
The patient's prescription has been approved and sent to   [id number] - [place], [place] - [address] AT [facility] OF [facility] & [facility] [address]  [address]  [place] [address]  Phone: [phone] Fax: [phone]

## 2022-12-15 DIAGNOSIS — I10 HTN (HYPERTENSION), BENIGN: ICD-10-CM

## 2022-12-15 RX ORDER — BENAZEPRIL HYDROCHLORIDE 10 MG/1
10 TABLET ORAL DAILY
Qty: 90 TABLET | Refills: 1 | Status: SHIPPED | OUTPATIENT
Start: 2022-12-15 | End: 2023-06-16 | Stop reason: SDUPTHER

## 2022-12-15 NOTE — TELEPHONE ENCOUNTER
----- Message from Leonela Fagan sent at 12/15/2022  9:02 AM CST -----  Patient called and stated that he need a refill of his benazepril called into [facility] [address] if any questions please give him a call at [phone]

## 2023-01-27 ENCOUNTER — OFFICE VISIT (OUTPATIENT)
Dept: FAMILY MEDICINE | Facility: CLINIC | Age: 63
End: 2023-01-27
Payer: COMMERCIAL

## 2023-01-27 VITALS
HEART RATE: 53 BPM | DIASTOLIC BLOOD PRESSURE: 84 MMHG | HEIGHT: 69 IN | OXYGEN SATURATION: 97 % | SYSTOLIC BLOOD PRESSURE: 132 MMHG | WEIGHT: 235.19 LBS | BODY MASS INDEX: 34.83 KG/M2

## 2023-01-27 DIAGNOSIS — I10 HTN (HYPERTENSION), BENIGN: Primary | ICD-10-CM

## 2023-01-27 DIAGNOSIS — Z79.899 LONG-TERM USE OF HIGH-RISK MEDICATION: ICD-10-CM

## 2023-01-27 DIAGNOSIS — K21.9 GASTROESOPHAGEAL REFLUX DISEASE WITHOUT ESOPHAGITIS: ICD-10-CM

## 2023-01-27 PROCEDURE — 1159F MED LIST DOCD IN RCRD: CPT | Mod: CPTII,S$GLB,, | Performed by: FAMILY MEDICINE

## 2023-01-27 PROCEDURE — 1160F RVW MEDS BY RX/DR IN RCRD: CPT | Mod: CPTII,S$GLB,, | Performed by: FAMILY MEDICINE

## 2023-01-27 PROCEDURE — 99396 PREV VISIT EST AGE 40-64: CPT | Mod: S$GLB,,, | Performed by: FAMILY MEDICINE

## 2023-01-27 PROCEDURE — 3079F PR MOST RECENT DIASTOLIC BLOOD PRESSURE 80-89 MM HG: ICD-10-PCS | Mod: CPTII,S$GLB,, | Performed by: FAMILY MEDICINE

## 2023-01-27 PROCEDURE — 1159F PR MEDICATION LIST DOCUMENTED IN MEDICAL RECORD: ICD-10-PCS | Mod: CPTII,S$GLB,, | Performed by: FAMILY MEDICINE

## 2023-01-27 PROCEDURE — 1160F PR REVIEW ALL MEDS BY PRESCRIBER/CLIN PHARMACIST DOCUMENTED: ICD-10-PCS | Mod: CPTII,S$GLB,, | Performed by: FAMILY MEDICINE

## 2023-01-27 PROCEDURE — 99396 PR PREVENTIVE VISIT,EST,40-64: ICD-10-PCS | Mod: S$GLB,,, | Performed by: FAMILY MEDICINE

## 2023-01-27 PROCEDURE — 3075F SYST BP GE 130 - 139MM HG: CPT | Mod: CPTII,S$GLB,, | Performed by: FAMILY MEDICINE

## 2023-01-27 PROCEDURE — 3008F BODY MASS INDEX DOCD: CPT | Mod: CPTII,S$GLB,, | Performed by: FAMILY MEDICINE

## 2023-01-27 PROCEDURE — 3008F PR BODY MASS INDEX (BMI) DOCUMENTED: ICD-10-PCS | Mod: CPTII,S$GLB,, | Performed by: FAMILY MEDICINE

## 2023-01-27 PROCEDURE — 3079F DIAST BP 80-89 MM HG: CPT | Mod: CPTII,S$GLB,, | Performed by: FAMILY MEDICINE

## 2023-01-27 PROCEDURE — 3075F PR MOST RECENT SYSTOLIC BLOOD PRESS GE 130-139MM HG: ICD-10-PCS | Mod: CPTII,S$GLB,, | Performed by: FAMILY MEDICINE

## 2023-01-27 NOTE — PROGRESS NOTES
SUBJECTIVE:    Patient ID: Korey Aguero is a 62 y.o. male.    Chief Complaint: Annual Exam (Annual exam/ declines PNA and flu vaccine//mp)    Pt here to checkup on acute and chronic conditions.      BP is doing ok.  Denies CP/SOB. Has lost 10 pounds since last visit. Has been walking. Has been taking husk seeds which cuts the appetite. Trying to get down to 200 lbs. Has a membership to [employer].    Right arm rash is not bothering him right now.  Uses topical steroid when needed.     Taking vitamin D.    Taking nexium every day. Not having heartburn.  Can miss a day or two and then will have issues, even with water.    Has not had hemorrhoids issues since taking husk.  ([place]) nifedipine 2%/lidocaine 1%.    Still taking CBD gummies, to help him relax and to help his joints.    No recent labs  -------------------------------------------  Cscope, hx polyp,  March 2022 (Carola)  Has had eye exam. (Alejandro)      No visits with results within 6 Month(s) from this visit.   Latest known visit with results is:   No results displayed because visit has over 200 results.          Past Medical History:   Diagnosis Date    On mechanically assisted ventilation 12/13/2020     Social History     Socioeconomic History    Marital status:    Tobacco Use    Smoking status: Never    Smokeless tobacco: Never     History reviewed. No pertinent surgical history.  History reviewed. No pertinent family history.    Review of patient's allergies indicates:   Allergen Reactions    Adhesive      Band-aide    Pcn [penicillins]        Current Outpatient Medications:     amLODIPine (NORVASC) 10 MG tablet, Take 1 tablet (10 mg total) by mouth once daily., Disp: 90 tablet, Rfl: 1    apple cider vinegar 300 mg Tab, Take by mouth., Disp: , Rfl:     benazepriL (LOTENSIN) 10 MG tablet, Take 1 tablet (10 mg total) by mouth once daily., Disp: 90 tablet, Rfl: 1    esomeprazole (NEXIUM) 20 MG capsule, Take 20 mg by mouth before  "breakfast., Disp: , Rfl:     vitamin D (VITAMIN D3) 1000 units Tab, Take 1,000 Units by mouth once daily., Disp: , Rfl:     Review of Systems   Constitutional:  Negative for appetite change, fatigue, fever and unexpected weight change.   Respiratory:  Negative for cough, chest tightness, shortness of breath and wheezing.    Cardiovascular:  Negative for chest pain and leg swelling.   Gastrointestinal:  Negative for abdominal pain, constipation, nausea and vomiting.        -heartburn   Genitourinary:  Negative for decreased urine volume, difficulty urinating, dysuria and frequency.   Musculoskeletal:  Negative for arthralgias, back pain, myalgias and neck pain.   Skin:  Negative for rash.   Neurological:  Negative for dizziness, weakness, numbness and headaches.   Hematological:  Does not bruise/bleed easily.   Psychiatric/Behavioral:  Negative for dysphoric mood, sleep disturbance and suicidal ideas. The patient is not nervous/anxious.    All other systems reviewed and are negative.       Objective:      Vitals:    01/27/23 0831   BP: 132/84   Pulse: (!) 53   SpO2: 97%   Weight: 106.7 kg (235 lb 3.2 oz)   Height: 5' 8.8" (1.748 m)       Wt Readings from Last 3 Encounters:   01/27/23 106.7 kg (235 lb 3.2 oz)   07/27/22 111.1 kg (245 lb)   04/14/22 110.2 kg (243 lb)       Physical Exam  Vitals reviewed.   Constitutional:       General: He is not in acute distress.     Appearance: Normal appearance. He is well-developed. He is obese.   HENT:      Head: Normocephalic and atraumatic.   Neck:      Thyroid: No thyromegaly.   Cardiovascular:      Rate and Rhythm: Normal rate and regular rhythm.      Heart sounds: Normal heart sounds. No murmur heard.    No friction rub.   Pulmonary:      Effort: Pulmonary effort is normal.      Breath sounds: Normal breath sounds. No wheezing or rales.   Abdominal:      General: Bowel sounds are normal. There is no distension.      Palpations: Abdomen is soft.      Tenderness: There is no " abdominal tenderness.   Musculoskeletal:      Cervical back: Neck supple.   Lymphadenopathy:      Cervical: No cervical adenopathy.   Skin:     General: Skin is warm and dry.      Findings: No rash.   Neurological:      Mental Status: He is alert and oriented to person, place, and time.      Motor: Motor function is intact.      Coordination: Coordination is intact.      Gait: Gait is intact.   Psychiatric:         Attention and Perception: He is attentive.         Speech: Speech normal.         Behavior: Behavior normal.         Thought Content: Thought content normal.         Judgment: Judgment normal.         Assessment:       1. HTN (hypertension), benign    2. Gastroesophageal reflux disease without esophagitis    3. Long-term use of high-risk medication           Plan:       HTN (hypertension), benign  Comments:  BP Controlled. Will continue to monitor BP on current medication regimen  Orders:  -     Comprehensive Metabolic Panel; Future; Expected date: 01/27/2023  -     Lipid Panel; Future; Expected date: 01/27/2023  -     Microalbumin/Creatinine Ratio, Urine; Future; Expected date: 01/27/2023  -     TSH w/reflex to FT4; Future; Expected date: 01/27/2023  -     Urinalysis, Reflex to Urine Culture Urine, Clean Catch; Future; Expected date: 01/27/2023  -     CBC Auto Differential; Future; Expected date: 01/27/2023    Gastroesophageal reflux disease without esophagitis  Comments:  Controlled. Will continue to monitor symptoms on nexium    Long-term use of high-risk medication  -     Comprehensive Metabolic Panel; Future; Expected date: 01/27/2023  -     Lipid Panel; Future; Expected date: 01/27/2023  -     Microalbumin/Creatinine Ratio, Urine; Future; Expected date: 01/27/2023  -     TSH w/reflex to FT4; Future; Expected date: 01/27/2023  -     Urinalysis, Reflex to Urine Culture Urine, Clean Catch; Future; Expected date: 01/27/2023  -     CBC Auto Differential; Future; Expected date: 01/27/2023      Labs and/or  tests have been ordered for the evaluation/monitoring of acute/chronic conditions, to be done just before next visit.    Follow up in about 6 months (around 7/27/2023) for HTN, GERD, LABS.        1/27/2023 Nader Valiente

## 2023-03-22 DIAGNOSIS — I10 HTN (HYPERTENSION), BENIGN: ICD-10-CM

## 2023-03-22 RX ORDER — AMLODIPINE BESYLATE 10 MG/1
10 TABLET ORAL DAILY
Qty: 90 TABLET | Refills: 3 | Status: SHIPPED | OUTPATIENT
Start: 2023-03-22 | End: 2024-01-29 | Stop reason: SDUPTHER

## 2023-03-22 NOTE — TELEPHONE ENCOUNTER
----- Message from Kathleen Bautista MA sent at 3/22/2023 11:11 AM CDT -----  Patient is calling for refill on amlodipine 10mg.     [facility] [address] [address]    Mr. Aguero:  [phone]  -DN

## 2023-03-22 NOTE — TELEPHONE ENCOUNTER
This prescription has been approved and sent to   [id number] - [place], [place] - [address] AT [facility] OF [facility] [address]  [address]  [place] [address]  Phone: [phone] Fax: [phone]

## 2023-06-16 DIAGNOSIS — I10 HTN (HYPERTENSION), BENIGN: ICD-10-CM

## 2023-06-16 RX ORDER — BENAZEPRIL HYDROCHLORIDE 10 MG/1
10 TABLET ORAL DAILY
Qty: 90 TABLET | Refills: 1 | Status: SHIPPED | OUTPATIENT
Start: 2023-06-16 | End: 2023-12-13 | Stop reason: SDUPTHER

## 2023-06-16 NOTE — TELEPHONE ENCOUNTER
----- Message from Leonela Fagan sent at 6/16/2023 11:10 AM CDT -----  Patient called and stated that he need a refill of his benazepril called into [facility] [address] in [place]. If any questions please give him a call at [phone]

## 2023-06-16 NOTE — TELEPHONE ENCOUNTER
The patient's prescription has been approved and sent to   [id number] - [place], [place] - [address] AT [facility] OF [facility] & [facility] [address]  [address]  [place] [address]  Phone: [phone] Fax: [phone]

## 2023-07-13 ENCOUNTER — TELEPHONE (OUTPATIENT)
Dept: FAMILY MEDICINE | Facility: CLINIC | Age: 63
End: 2023-07-13

## 2023-07-13 NOTE — TELEPHONE ENCOUNTER
Spoke to patient that fasting lab and urine is due a week prior to visit, orders at [facility], encouraged water. Advised he can take morning meds that do not need to be taken with food.

## 2023-07-27 ENCOUNTER — DOCUMENTATION ONLY (OUTPATIENT)
Dept: FAMILY MEDICINE | Facility: CLINIC | Age: 63
End: 2023-07-27

## 2023-07-27 ENCOUNTER — OFFICE VISIT (OUTPATIENT)
Dept: FAMILY MEDICINE | Facility: CLINIC | Age: 63
End: 2023-07-27
Payer: COMMERCIAL

## 2023-07-27 VITALS
HEART RATE: 68 BPM | HEIGHT: 68 IN | BODY MASS INDEX: 35.01 KG/M2 | DIASTOLIC BLOOD PRESSURE: 68 MMHG | OXYGEN SATURATION: 99 % | SYSTOLIC BLOOD PRESSURE: 120 MMHG | WEIGHT: 231 LBS

## 2023-07-27 DIAGNOSIS — K21.9 GASTROESOPHAGEAL REFLUX DISEASE WITHOUT ESOPHAGITIS: ICD-10-CM

## 2023-07-27 DIAGNOSIS — I10 ESSENTIAL HYPERTENSION: Primary | ICD-10-CM

## 2023-07-27 PROCEDURE — 4010F PR ACE/ARB THEARPY RXD/TAKEN: ICD-10-PCS | Mod: CPTII,S$GLB,, | Performed by: FAMILY MEDICINE

## 2023-07-27 PROCEDURE — 4010F ACE/ARB THERAPY RXD/TAKEN: CPT | Mod: CPTII,S$GLB,, | Performed by: FAMILY MEDICINE

## 2023-07-27 PROCEDURE — 1159F PR MEDICATION LIST DOCUMENTED IN MEDICAL RECORD: ICD-10-PCS | Mod: CPTII,S$GLB,, | Performed by: FAMILY MEDICINE

## 2023-07-27 PROCEDURE — 3078F PR MOST RECENT DIASTOLIC BLOOD PRESSURE < 80 MM HG: ICD-10-PCS | Mod: CPTII,S$GLB,, | Performed by: FAMILY MEDICINE

## 2023-07-27 PROCEDURE — 1160F RVW MEDS BY RX/DR IN RCRD: CPT | Mod: CPTII,S$GLB,, | Performed by: FAMILY MEDICINE

## 2023-07-27 PROCEDURE — 99214 OFFICE O/P EST MOD 30 MIN: CPT | Mod: S$GLB,,, | Performed by: FAMILY MEDICINE

## 2023-07-27 PROCEDURE — 3074F SYST BP LT 130 MM HG: CPT | Mod: CPTII,S$GLB,, | Performed by: FAMILY MEDICINE

## 2023-07-27 PROCEDURE — 3008F PR BODY MASS INDEX (BMI) DOCUMENTED: ICD-10-PCS | Mod: CPTII,S$GLB,, | Performed by: FAMILY MEDICINE

## 2023-07-27 PROCEDURE — 99214 PR OFFICE/OUTPT VISIT, EST, LEVL IV, 30-39 MIN: ICD-10-PCS | Mod: S$GLB,,, | Performed by: FAMILY MEDICINE

## 2023-07-27 PROCEDURE — 3008F BODY MASS INDEX DOCD: CPT | Mod: CPTII,S$GLB,, | Performed by: FAMILY MEDICINE

## 2023-07-27 PROCEDURE — 3078F DIAST BP <80 MM HG: CPT | Mod: CPTII,S$GLB,, | Performed by: FAMILY MEDICINE

## 2023-07-27 PROCEDURE — 1160F PR REVIEW ALL MEDS BY PRESCRIBER/CLIN PHARMACIST DOCUMENTED: ICD-10-PCS | Mod: CPTII,S$GLB,, | Performed by: FAMILY MEDICINE

## 2023-07-27 PROCEDURE — 1159F MED LIST DOCD IN RCRD: CPT | Mod: CPTII,S$GLB,, | Performed by: FAMILY MEDICINE

## 2023-07-27 PROCEDURE — 3074F PR MOST RECENT SYSTOLIC BLOOD PRESSURE < 130 MM HG: ICD-10-PCS | Mod: CPTII,S$GLB,, | Performed by: FAMILY MEDICINE

## 2023-07-27 RX ORDER — INDOMETHACIN 75 MG/1
75 CAPSULE, EXTENDED RELEASE ORAL 2 TIMES DAILY WITH MEALS
Qty: 30 CAPSULE | Refills: 0 | Status: SHIPPED | OUTPATIENT
Start: 2023-07-27

## 2023-07-27 NOTE — PROGRESS NOTES
SUBJECTIVE:    Patient ID: Korey Aguero is a 63 y.o. male.    Chief Complaint: Follow-up (No bottles, Follow up, //Ba )    Pt here to checkup on acute and chronic conditions.      BP is doing ok.  Denies CP/SOB. Has lost 4 pounds since last visit. Has been walking 5 miles daily. Has been doing weights at home as well 3 days a week.    Right arm rash is not bothering him right now.  Uses topical steroid when needed.     Taking vitamin D.    Taking nexium every day. Not having heartburn.  Can miss a day or two and then will have issues, even with water.    Has not had hemorrhoids issues since taking husk.  (Carola) nifedipine 2%/lidocaine 1%.    Still taking CBD gummies, to help him relax and to help his joints.    No recent labs. Plans to do today.   -------------------------------------------  Cscope, hx polyp,  March 2022 (Carola)  Has had eye exam. (O'champagne)      No visits with results within 6 Month(s) from this visit.   Latest known visit with results is:   No results displayed because visit has over 200 results.          Past Medical History:   Diagnosis Date    On mechanically assisted ventilation 12/13/2020     Social History     Socioeconomic History    Marital status:    Tobacco Use    Smoking status: Never    Smokeless tobacco: Never     History reviewed. No pertinent surgical history.  History reviewed. No pertinent family history.    Review of patient's allergies indicates:   Allergen Reactions    Adhesive      Band-aide    Pcn [penicillins]        Current Outpatient Medications:     amLODIPine (NORVASC) 10 MG tablet, Take 1 tablet (10 mg total) by mouth once daily., Disp: 90 tablet, Rfl: 3    apple cider vinegar 300 mg Tab, Take by mouth., Disp: , Rfl:     benazepriL (LOTENSIN) 10 MG tablet, Take 1 tablet (10 mg total) by mouth once daily., Disp: 90 tablet, Rfl: 1    esomeprazole (NEXIUM) 20 MG capsule, Take 20 mg by mouth before breakfast., Disp: , Rfl:     vitamin D (VITAMIN D3) 1000  "units Tab, Take 1,000 Units by mouth once daily., Disp: , Rfl:     Review of Systems   Constitutional:  Negative for appetite change and unexpected weight change.   Respiratory:  Negative for chest tightness, shortness of breath and wheezing.    Cardiovascular:  Negative for leg swelling.   Gastrointestinal:  Negative for constipation.        -heartburn   Genitourinary:  Negative for decreased urine volume, difficulty urinating, dysuria and frequency.   Musculoskeletal:  Negative for back pain.   Neurological:  Negative for dizziness.   Hematological:  Does not bruise/bleed easily.   Psychiatric/Behavioral:  Negative for dysphoric mood, sleep disturbance and suicidal ideas. The patient is not nervous/anxious.    All other systems reviewed and are negative.       Objective:      Vitals:    07/27/23 0934   BP: 120/68   Pulse: 68   SpO2: 99%   Weight: 104.8 kg (231 lb)   Height: 5' 8" (1.727 m)       Wt Readings from Last 3 Encounters:   07/27/23 104.8 kg (231 lb)   01/27/23 106.7 kg (235 lb 3.2 oz)   07/27/22 111.1 kg (245 lb)       Physical Exam  Vitals reviewed.   Constitutional:       General: He is not in acute distress.     Appearance: Normal appearance. He is well-developed. He is obese.   HENT:      Head: Normocephalic and atraumatic.   Neck:      Thyroid: No thyromegaly.   Cardiovascular:      Rate and Rhythm: Normal rate and regular rhythm.      Heart sounds: Normal heart sounds. No murmur heard.    No friction rub.   Pulmonary:      Effort: Pulmonary effort is normal.      Breath sounds: Normal breath sounds. No wheezing or rales.   Abdominal:      General: Bowel sounds are normal. There is no distension.      Palpations: Abdomen is soft.      Tenderness: There is no abdominal tenderness.   Musculoskeletal:      Cervical back: Neck supple.   Lymphadenopathy:      Cervical: No cervical adenopathy.   Skin:     General: Skin is warm and dry.      Findings: No rash.   Neurological:      Mental Status: He is " alert and oriented to person, place, and time.      Motor: Motor function is intact.      Coordination: Coordination is intact.      Gait: Gait is intact.   Psychiatric:         Attention and Perception: He is attentive.         Speech: Speech normal.         Behavior: Behavior normal.         Thought Content: Thought content normal.         Judgment: Judgment normal.         Assessment:       1. Essential hypertension    2. Gastroesophageal reflux disease without esophagitis             Plan:       Essential hypertension  Comments:  Controlled. Will continue to monitor BP on current medication regimen    Gastroesophageal reflux disease without esophagitis  Comments:  Controlled. Will continue to monitor symptoms.      Labs and/or tests have been ordered for the evaluation/monitoring of acute/chronic conditions, to be done today.    Follow up in about 6 months (around 1/27/2024) for HTN, GERD.        7/27/2023 Nader Valiente

## 2023-07-29 LAB
ALBUMIN SERPL-MCNC: 4.2 G/DL (ref 3.6–5.1)
ALBUMIN/CREAT UR: 3 MCG/MG CREAT
ALBUMIN/GLOB SERPL: 1.8 (CALC) (ref 1–2.5)
ALP SERPL-CCNC: 67 U/L (ref 35–144)
ALT SERPL-CCNC: 23 U/L (ref 9–46)
APPEARANCE UR: CLEAR
AST SERPL-CCNC: 19 U/L (ref 10–35)
BACTERIA #/AREA URNS HPF: ABNORMAL /HPF
BACTERIA UR CULT: ABNORMAL
BACTERIA UR CULT: ABNORMAL
BASOPHILS # BLD AUTO: 30 CELLS/UL (ref 0–200)
BASOPHILS NFR BLD AUTO: 0.4 %
BILIRUB SERPL-MCNC: 0.7 MG/DL (ref 0.2–1.2)
BILIRUB UR QL STRIP: NEGATIVE
BUN SERPL-MCNC: 14 MG/DL (ref 7–25)
BUN/CREAT SERPL: NORMAL (CALC) (ref 6–22)
CALCIUM SERPL-MCNC: 9.6 MG/DL (ref 8.6–10.3)
CAOX CRY #/AREA URNS HPF: ABNORMAL /HPF
CHLORIDE SERPL-SCNC: 106 MMOL/L (ref 98–110)
CHOLEST SERPL-MCNC: 185 MG/DL
CHOLEST/HDLC SERPL: 3.6 (CALC)
CO2 SERPL-SCNC: 29 MMOL/L (ref 20–32)
COLOR UR: ABNORMAL
CREAT SERPL-MCNC: 0.84 MG/DL (ref 0.7–1.35)
CREAT UR-MCNC: 413 MG/DL (ref 20–320)
EGFR: 99 ML/MIN/1.73M2
EOSINOPHIL # BLD AUTO: 126 CELLS/UL (ref 15–500)
EOSINOPHIL NFR BLD AUTO: 1.7 %
ERYTHROCYTE [DISTWIDTH] IN BLOOD BY AUTOMATED COUNT: 13.7 % (ref 11–15)
GLOBULIN SER CALC-MCNC: 2.4 G/DL (CALC) (ref 1.9–3.7)
GLUCOSE SERPL-MCNC: 95 MG/DL (ref 65–99)
GLUCOSE UR QL STRIP: NEGATIVE
HCT VFR BLD AUTO: 40.4 % (ref 38.5–50)
HDLC SERPL-MCNC: 51 MG/DL
HGB BLD-MCNC: 14.2 G/DL (ref 13.2–17.1)
HGB UR QL STRIP: NEGATIVE
HYALINE CASTS #/AREA URNS LPF: ABNORMAL /LPF
KETONES UR QL STRIP: ABNORMAL
LDLC SERPL CALC-MCNC: 108 MG/DL (CALC)
LEUKOCYTE ESTERASE UR QL STRIP: ABNORMAL
LYMPHOCYTES # BLD AUTO: 2731 CELLS/UL (ref 850–3900)
LYMPHOCYTES NFR BLD AUTO: 36.9 %
MCH RBC QN AUTO: 32.1 PG (ref 27–33)
MCHC RBC AUTO-ENTMCNC: 35.1 G/DL (ref 32–36)
MCV RBC AUTO: 91.2 FL (ref 80–100)
MICROALBUMIN UR-MCNC: 1.3 MG/DL
MONOCYTES # BLD AUTO: 585 CELLS/UL (ref 200–950)
MONOCYTES NFR BLD AUTO: 7.9 %
NEUTROPHILS # BLD AUTO: 3929 CELLS/UL (ref 1500–7800)
NEUTROPHILS NFR BLD AUTO: 53.1 %
NITRITE UR QL STRIP: NEGATIVE
NONHDLC SERPL-MCNC: 134 MG/DL (CALC)
PH UR STRIP: 5.5 [PH] (ref 5–8)
PLATELET # BLD AUTO: 270 THOUSAND/UL (ref 140–400)
PMV BLD REES-ECKER: 10.3 FL (ref 7.5–12.5)
POTASSIUM SERPL-SCNC: 4.6 MMOL/L (ref 3.5–5.3)
PROT SERPL-MCNC: 6.6 G/DL (ref 6.1–8.1)
PROT UR QL STRIP: ABNORMAL
RBC # BLD AUTO: 4.43 MILLION/UL (ref 4.2–5.8)
RBC #/AREA URNS HPF: ABNORMAL /HPF
SERVICE CMNT-IMP: ABNORMAL
SODIUM SERPL-SCNC: 142 MMOL/L (ref 135–146)
SP GR UR STRIP: 1.03 (ref 1–1.03)
SQUAMOUS #/AREA URNS HPF: ABNORMAL /HPF
TRIGL SERPL-MCNC: 138 MG/DL
TSH SERPL-ACNC: 0.61 MIU/L (ref 0.4–4.5)
WBC # BLD AUTO: 7.4 THOUSAND/UL (ref 3.8–10.8)
WBC #/AREA URNS HPF: ABNORMAL /HPF

## 2023-08-08 ENCOUNTER — TELEPHONE (OUTPATIENT)
Dept: FAMILY MEDICINE | Facility: CLINIC | Age: 63
End: 2023-08-08

## 2023-12-13 DIAGNOSIS — I10 HTN (HYPERTENSION), BENIGN: ICD-10-CM

## 2023-12-13 RX ORDER — BENAZEPRIL HYDROCHLORIDE 10 MG/1
10 TABLET ORAL DAILY
Qty: 90 TABLET | Refills: 1 | Status: SHIPPED | OUTPATIENT
Start: 2023-12-13 | End: 2024-01-29 | Stop reason: SDUPTHER

## 2023-12-13 NOTE — TELEPHONE ENCOUNTER
----- Message from Shayy Barrios sent at 12/13/2023  9:37 AM CST -----  Pt needs refill on benazepril  Obie ar[facility]     [phone]

## 2023-12-13 NOTE — TELEPHONE ENCOUNTER
The patient's prescription has been approved and sent to   [id number] - [place], [place] - [address] AT [facility] OF [facility] [address]  [address]  [place] [address]  Phone: [phone] Fax: [phone]

## 2024-01-09 DIAGNOSIS — J01.00 ACUTE NON-RECURRENT MAXILLARY SINUSITIS: Primary | ICD-10-CM

## 2024-01-09 RX ORDER — HYDROCODONE POLISTIREX AND CHLORPHENIRAMINE POLISTIREX 10; 8 MG/5ML; MG/5ML
5 SUSPENSION, EXTENDED RELEASE ORAL EVERY 12 HOURS PRN
Qty: 70 ML | Refills: 0 | Status: SHIPPED | OUTPATIENT
Start: 2024-01-09 | End: 2024-01-29

## 2024-01-09 RX ORDER — AZITHROMYCIN 250 MG/1
TABLET, FILM COATED ORAL
Qty: 6 TABLET | Refills: 0 | Status: SHIPPED | OUTPATIENT
Start: 2024-01-09 | End: 2024-01-14

## 2024-01-09 NOTE — PROGRESS NOTES
Pt called after hours complaining of issues with sinuses and continued cough, that has not improved with usual otc meds.. Will send anbx and cough syrup to the pharmacy.

## 2024-01-28 NOTE — PROGRESS NOTES
SUBJECTIVE:    Patient ID: Korey Aguero is a 63 y.o. male.    Chief Complaint: Follow-up (6 mo f/u//no med bottles//would like prostate screening//declined flu vac//tc)    Pt here to checkup on acute and chronic conditions.      BP is doing ok.  Denies CP/SOB. Has been walking 3 miles daily, now that it is winter and the cold air bothers him. Has been doing weights at home as well 3 days a week.    Right arm rash is not bothering him right now.  Uses topical steroid when needed.     Taking vitamin D, E, and C.    Taking nexium every day. Not having heartburn.  Can miss a day or two and then will have issues, even with water.    Has not had hemorrhoids issues since taking husk pills.  (Carola) nifedipine 2%/lidocaine 1%.    Still taking CBD gummies, to help him relax and to help his joints.    Had labs done after last visit: fBS 95, GFR 99, TSH 0.61, microalb/cr 3, .   -------------------------------------------  Cscope, hx polyp,  March 2022 (Carola)  Has had eye exam. (Alejandro)        No visits with results within 6 Month(s) from this visit.   Latest known visit with results is:   Office Visit on 01/27/2023   Component Date Value Ref Range Status    Glucose 07/27/2023 95  65 - 99 mg/dL Final    BUN 07/27/2023 14  7 - 25 mg/dL Final    Creatinine 07/27/2023 0.84  0.70 - 1.35 mg/dL Final    eGFR 07/27/2023 99  > OR = 60 mL/min/1.73m2 Final    BUN/Creatinine Ratio 07/27/2023 NOT APPLICABLE  6 - 22 (calc) Final    Sodium 07/27/2023 142  135 - 146 mmol/L Final    Potassium 07/27/2023 4.6  3.5 - 5.3 mmol/L Final    Chloride 07/27/2023 106  98 - 110 mmol/L Final    CO2 07/27/2023 29  20 - 32 mmol/L Final    Calcium 07/27/2023 9.6  8.6 - 10.3 mg/dL Final    Total Protein 07/27/2023 6.6  6.1 - 8.1 g/dL Final    Albumin 07/27/2023 4.2  3.6 - 5.1 g/dL Final    Globulin, Total 07/27/2023 2.4  1.9 - 3.7 g/dL (calc) Final    Albumin/Globulin Ratio 07/27/2023 1.8  1.0 - 2.5 (calc) Final    Total Bilirubin  07/27/2023 0.7  0.2 - 1.2 mg/dL Final    Alkaline Phosphatase 07/27/2023 67  35 - 144 U/L Final    AST 07/27/2023 19  10 - 35 U/L Final    ALT 07/27/2023 23  9 - 46 U/L Final    Cholesterol 07/27/2023 185  <200 mg/dL Final    HDL 07/27/2023 51  > OR = 40 mg/dL Final    Triglycerides 07/27/2023 138  <150 mg/dL Final    LDL Cholesterol 07/27/2023 108 (H)  mg/dL (calc) Final    HDL/Cholesterol Ratio 07/27/2023 3.6  <5.0 (calc) Final    Non HDL Chol. (LDL+VLDL) 07/27/2023 134 (H)  <130 mg/dL (calc) Final    Creatinine, Urine 07/27/2023 413 (H)  20 - 320 mg/dL Final    Microalb, Ur 07/27/2023 1.3  See Note: mg/dL Final    Microalb/Creat Ratio 07/27/2023 3  <30 mcg/mg creat Final    TSH w/reflex to FT4 07/27/2023 0.61  0.40 - 4.50 mIU/L Final    Color, UA 07/27/2023 DARK YELLOW  YELLOW Final    Appearance, UA 07/27/2023 CLEAR  CLEAR Final    Specific Gravity, UA 07/27/2023 1.033  1.001 - 1.035 Final    pH, UA 07/27/2023 5.5  5.0 - 8.0 Final    Glucose, UA 07/27/2023 NEGATIVE  NEGATIVE Final    Bilirubin, UA 07/27/2023 NEGATIVE  NEGATIVE Final    Ketones, UA 07/27/2023 TRACE (A)  NEGATIVE Final    Occult Blood UA 07/27/2023 NEGATIVE  NEGATIVE Final    Protein, UA 07/27/2023 TRACE (A)  NEGATIVE Final    Nitrite, UA 07/27/2023 NEGATIVE  NEGATIVE Final    Leukocytes, UA 07/27/2023 TRACE (A)  NEGATIVE Final    WBC Casts, UA 07/27/2023 NONE SEEN  < OR = 5 /HPF Final    RBC Casts, UA 07/27/2023 NONE SEEN  < OR = 2 /HPF Final    Squam Epithel, UA 07/27/2023 NONE SEEN  < OR = 5 /HPF Final    Bacteria, UA 07/27/2023 NONE SEEN  NONE SEEN /HPF Final    Ca Oxalate Hawa, UA 07/27/2023 MANY (A)  NONE OR FEW /HPF Final    Hyaline Casts, UA 07/27/2023 0-5 (A)  NONE SEEN /LPF Final    Service Cmt: 07/27/2023    Final    Reflexive Urine Culture 07/27/2023    Final    Urine Culture, Routine 07/27/2023    Final    WBC 07/27/2023 7.4  3.8 - 10.8 Thousand/uL Final    RBC 07/27/2023 4.43  4.20 - 5.80 Million/uL Final    Hemoglobin 07/27/2023  14.2  13.2 - 17.1 g/dL Final    Hematocrit 07/27/2023 40.4  38.5 - 50.0 % Final    MCV 07/27/2023 91.2  80.0 - 100.0 fL Final    MCH 07/27/2023 32.1  27.0 - 33.0 pg Final    MCHC 07/27/2023 35.1  32.0 - 36.0 g/dL Final    RDW 07/27/2023 13.7  11.0 - 15.0 % Final    Platelets 07/27/2023 270  140 - 400 Thousand/uL Final    MPV 07/27/2023 10.3  7.5 - 12.5 fL Final    Neutrophils, Abs 07/27/2023 3,929  1,500 - 7,800 cells/uL Final    Lymph # 07/27/2023 2,731  850 - 3,900 cells/uL Final    Mono # 07/27/2023 585  200 - 950 cells/uL Final    Eos # 07/27/2023 126  15 - 500 cells/uL Final    Baso # 07/27/2023 30  0 - 200 cells/uL Final    Neutrophils Relative 07/27/2023 53.1  % Final    Lymph % 07/27/2023 36.9  % Final    Mono % 07/27/2023 7.9  % Final    Eosinophil % 07/27/2023 1.7  % Final    Basophil % 07/27/2023 0.4  % Final       Past Medical History:   Diagnosis Date    On mechanically assisted ventilation 12/13/2020     Social History     Socioeconomic History    Marital status:    Tobacco Use    Smoking status: Never    Smokeless tobacco: Never     Social Determinants of Health     Financial Resource Strain: Low Risk  (1/29/2024)    Overall Financial Resource Strain (CARDIA)     Difficulty of Paying Living Expenses: Not hard at all   Food Insecurity: No Food Insecurity (1/29/2024)    Hunger Vital Sign     Worried About Running Out of Food in the Last Year: Never true     Ran Out of Food in the Last Year: Never true   Transportation Needs: No Transportation Needs (1/29/2024)    PRAPARE - Transportation     Lack of Transportation (Medical): No     Lack of Transportation (Non-Medical): No   Physical Activity: Sufficiently Active (1/29/2024)    Exercise Vital Sign     Days of Exercise per Week: 4 days     Minutes of Exercise per Session: 60 min   Stress: No Stress Concern Present (1/29/2024)    [employer] - Occupational Stress Questionnaire     Feeling of Stress : Only a little    Social Connections: Unknown (1/29/2024)    Social Connection and Isolation Panel [NHANES]     Frequency of Communication with Friends and Family: More than three times a week     Frequency of Social Gatherings with Friends and Family: More than three times a week     Active Member of Clubs or Organizations: Yes     Attends Club or Organization Meetings: More than 4 times per year     Marital Status:    Housing Stability: Low Risk  (1/29/2024)    Housing Stability Vital Sign     Unable to Pay for Housing in the Last Year: No     Number of Places Lived in the Last Year: 1     Unstable Housing in the Last Year: No     No past surgical history on file.  No family history on file.    Review of patient's allergies indicates:   Allergen Reactions    Adhesive      Band-aide    Pcn [penicillins]        Current Outpatient Medications:     apple cider vinegar 300 mg Tab, Take by mouth., Disp: , Rfl:     esomeprazole (NEXIUM) 20 MG capsule, Take 20 mg by mouth before breakfast., Disp: , Rfl:     indomethacin (INDOCIN SR) 75 mg CpSR CR capsule, Take 1 capsule (75 mg total) by mouth 2 (two) times daily with meals. As needed for pain, Disp: 30 capsule, Rfl: 0    vitamin D (VITAMIN D3) 1000 units Tab, Take 1,000 Units by mouth once daily., Disp: , Rfl:     amLODIPine (NORVASC) 10 MG tablet, Take 1 tablet (10 mg total) by mouth once daily., Disp: 90 tablet, Rfl: 3    benazepriL (LOTENSIN) 10 MG tablet, Take 1 tablet (10 mg total) by mouth once daily., Disp: 90 tablet, Rfl: 3    Review of Systems   Constitutional:  Negative for activity change, appetite change and unexpected weight change.   HENT:  Negative for hearing loss, rhinorrhea and trouble swallowing.    Eyes:  Negative for discharge and visual disturbance.   Respiratory:  Negative for chest tightness, shortness of breath and wheezing.    Cardiovascular:  Negative for chest pain, palpitations and leg swelling.   Gastrointestinal:  Negative for blood in stool,  "constipation, diarrhea and vomiting.        -heartburn   Endocrine: Negative for polydipsia and polyuria.   Genitourinary:  Negative for decreased urine volume, difficulty urinating, dysuria, frequency, hematuria and urgency.   Musculoskeletal:  Negative for arthralgias, back pain, joint swelling and neck pain.   Neurological:  Negative for dizziness, weakness and headaches.   Hematological:  Does not bruise/bleed easily.   Psychiatric/Behavioral:  Negative for confusion, dysphoric mood, sleep disturbance and suicidal ideas. The patient is not nervous/anxious.    All other systems reviewed and are negative.         Objective:      Vitals:    01/29/24 0826   BP: 128/78   Pulse: 68   Weight: 105.2 kg (232 lb)   Height: 5' 8" (1.727 m)         Wt Readings from Last 3 Encounters:   01/29/24 105.2 kg (232 lb)   07/27/23 104.8 kg (231 lb)   01/27/23 106.7 kg (235 lb 3.2 oz)       Physical Exam  Vitals reviewed.   Constitutional:       General: He is not in acute distress.     Appearance: Normal appearance. He is well-developed. He is obese.   HENT:      Head: Normocephalic and atraumatic.   Neck:      Thyroid: No thyromegaly.   Cardiovascular:      Rate and Rhythm: Normal rate and regular rhythm.      Heart sounds: Normal heart sounds. No murmur heard.     No friction rub.   Pulmonary:      Effort: Pulmonary effort is normal.      Breath sounds: Normal breath sounds. No wheezing or rales.   Abdominal:      General: Bowel sounds are normal. There is no distension.      Palpations: Abdomen is soft.      Tenderness: There is no abdominal tenderness.   Musculoskeletal:      Cervical back: Neck supple.   Lymphadenopathy:      Cervical: No cervical adenopathy.   Skin:     General: Skin is warm and dry.      Findings: No rash.   Neurological:      Mental Status: He is alert and oriented to person, place, and time.      Motor: Motor function is intact.      Coordination: Coordination is intact.      Gait: Gait is intact. "   Psychiatric:         Attention and Perception: He is attentive.         Speech: Speech normal.         Behavior: Behavior normal.         Thought Content: Thought content normal.         Judgment: Judgment normal.           Assessment:       1. HTN (hypertension), benign    2. Gastroesophageal reflux disease without esophagitis    3. Screening for prostate cancer    4. Long-term use of high-risk medication               Plan:       HTN (hypertension), benign  Comments:  Controlled. Will continue to monitor BP on current medication regimen  Orders:  -     amLODIPine (NORVASC) 10 MG tablet; Take 1 tablet (10 mg total) by mouth once daily.  Dispense: 90 tablet; Refill: 3  -     benazepriL (LOTENSIN) 10 MG tablet; Take 1 tablet (10 mg total) by mouth once daily.  Dispense: 90 tablet; Refill: 3    Gastroesophageal reflux disease without esophagitis  Comments:  Controlled. Will continue to monitor symptoms, Encouraged to try QOD.    Screening for prostate cancer  -     PSA, Screening; Future; Expected date: 01/29/2024    Long-term use of high-risk medication  -     TSH w/reflex to FT4; Future; Expected date: 01/29/2024  -     Urinalysis, Reflex to Urine Culture Urine, Clean Catch; Future; Expected date: 01/29/2024  -     CBC Auto Differential; Future; Expected date: 01/29/2024  -     Lipid Panel; Future; Expected date: 01/29/2024  -     Comprehensive Metabolic Panel; Future; Expected date: 01/29/2024  -     Microalbumin/Creatinine Ratio, Urine; Future; Expected date: 01/29/2024        Labs and/or tests have been ordered for the evaluation/monitoring of acute/chronic conditions, to be done today.    Follow up in about 6 months (around 7/29/2024) for HTN, GERD, LABS.        1/29/2024 Nader Valiente

## 2024-01-29 ENCOUNTER — OFFICE VISIT (OUTPATIENT)
Dept: FAMILY MEDICINE | Facility: CLINIC | Age: 64
End: 2024-01-29
Payer: COMMERCIAL

## 2024-01-29 VITALS
DIASTOLIC BLOOD PRESSURE: 78 MMHG | SYSTOLIC BLOOD PRESSURE: 128 MMHG | HEIGHT: 68 IN | HEART RATE: 68 BPM | WEIGHT: 232 LBS | BODY MASS INDEX: 35.16 KG/M2

## 2024-01-29 DIAGNOSIS — I10 HTN (HYPERTENSION), BENIGN: Primary | ICD-10-CM

## 2024-01-29 DIAGNOSIS — Z12.5 SCREENING FOR PROSTATE CANCER: ICD-10-CM

## 2024-01-29 DIAGNOSIS — K21.9 GASTROESOPHAGEAL REFLUX DISEASE WITHOUT ESOPHAGITIS: ICD-10-CM

## 2024-01-29 DIAGNOSIS — Z79.899 LONG-TERM USE OF HIGH-RISK MEDICATION: ICD-10-CM

## 2024-01-29 PROCEDURE — 3008F BODY MASS INDEX DOCD: CPT | Mod: CPTII,S$GLB,, | Performed by: FAMILY MEDICINE

## 2024-01-29 PROCEDURE — 1159F MED LIST DOCD IN RCRD: CPT | Mod: CPTII,S$GLB,, | Performed by: FAMILY MEDICINE

## 2024-01-29 PROCEDURE — 3078F DIAST BP <80 MM HG: CPT | Mod: CPTII,S$GLB,, | Performed by: FAMILY MEDICINE

## 2024-01-29 PROCEDURE — 1160F RVW MEDS BY RX/DR IN RCRD: CPT | Mod: CPTII,S$GLB,, | Performed by: FAMILY MEDICINE

## 2024-01-29 PROCEDURE — 4010F ACE/ARB THERAPY RXD/TAKEN: CPT | Mod: CPTII,S$GLB,, | Performed by: FAMILY MEDICINE

## 2024-01-29 PROCEDURE — 99214 OFFICE O/P EST MOD 30 MIN: CPT | Mod: S$GLB,,, | Performed by: FAMILY MEDICINE

## 2024-01-29 PROCEDURE — 3074F SYST BP LT 130 MM HG: CPT | Mod: CPTII,S$GLB,, | Performed by: FAMILY MEDICINE

## 2024-01-29 RX ORDER — AMLODIPINE BESYLATE 10 MG/1
10 TABLET ORAL DAILY
Qty: 90 TABLET | Refills: 3 | Status: SHIPPED | OUTPATIENT
Start: 2024-01-29

## 2024-01-29 RX ORDER — BENAZEPRIL HYDROCHLORIDE 10 MG/1
10 TABLET ORAL DAILY
Qty: 90 TABLET | Refills: 3 | Status: SHIPPED | OUTPATIENT
Start: 2024-01-29 | End: 2024-06-10 | Stop reason: SDUPTHER

## 2024-03-15 RX ORDER — AZITHROMYCIN 250 MG/1
TABLET, FILM COATED ORAL
Qty: 6 TABLET | Refills: 0 | Status: SHIPPED | OUTPATIENT
Start: 2024-03-15 | End: 2024-03-20

## 2024-03-15 NOTE — PROGRESS NOTES
Pt contacted after hours complaining of sinus symptoms for three days and getting worse. Requests antibiotics sent to usual pharmacy.

## 2024-06-10 DIAGNOSIS — I10 HTN (HYPERTENSION), BENIGN: ICD-10-CM

## 2024-06-10 RX ORDER — BENAZEPRIL HYDROCHLORIDE 10 MG/1
10 TABLET ORAL DAILY
Qty: 90 TABLET | Refills: 3 | Status: SHIPPED | OUTPATIENT
Start: 2024-06-10

## 2024-06-10 NOTE — TELEPHONE ENCOUNTER
----- Message from Kathleen Bautista MA sent at 6/10/2024 11:35 AM CDT -----  Patient is calling requesting a refill on benazepril 10mg    Paola Ragland  Pt: [phone]  -DN

## 2024-06-10 NOTE — TELEPHONE ENCOUNTER
The patient's prescription has been approved and sent to   [id number] - [place], [place] - [address] AT [facility] OF [facility] [address]  [address]  [place] [address]  Phone: [phone] Fax: [phone]

## 2024-06-19 ENCOUNTER — OFFICE VISIT (OUTPATIENT)
Dept: FAMILY MEDICINE | Facility: CLINIC | Age: 64
End: 2024-06-19
Payer: COMMERCIAL

## 2024-06-19 VITALS
HEIGHT: 68 IN | WEIGHT: 238 LBS | SYSTOLIC BLOOD PRESSURE: 104 MMHG | BODY MASS INDEX: 36.07 KG/M2 | HEART RATE: 86 BPM | DIASTOLIC BLOOD PRESSURE: 62 MMHG

## 2024-06-19 DIAGNOSIS — L30.9 DERMATITIS: Primary | ICD-10-CM

## 2024-06-19 PROCEDURE — 99212 OFFICE O/P EST SF 10 MIN: CPT | Mod: S$GLB,,, | Performed by: FAMILY MEDICINE

## 2024-06-19 PROCEDURE — 3078F DIAST BP <80 MM HG: CPT | Mod: CPTII,S$GLB,, | Performed by: FAMILY MEDICINE

## 2024-06-19 PROCEDURE — 1159F MED LIST DOCD IN RCRD: CPT | Mod: CPTII,S$GLB,, | Performed by: FAMILY MEDICINE

## 2024-06-19 PROCEDURE — 1160F RVW MEDS BY RX/DR IN RCRD: CPT | Mod: CPTII,S$GLB,, | Performed by: FAMILY MEDICINE

## 2024-06-19 PROCEDURE — 3008F BODY MASS INDEX DOCD: CPT | Mod: CPTII,S$GLB,, | Performed by: FAMILY MEDICINE

## 2024-06-19 PROCEDURE — 3074F SYST BP LT 130 MM HG: CPT | Mod: CPTII,S$GLB,, | Performed by: FAMILY MEDICINE

## 2024-06-19 PROCEDURE — 4010F ACE/ARB THERAPY RXD/TAKEN: CPT | Mod: CPTII,S$GLB,, | Performed by: FAMILY MEDICINE

## 2024-06-19 RX ORDER — KETOCONAZOLE 20 MG/G
CREAM TOPICAL DAILY
Qty: 30 G | Refills: 0 | Status: SHIPPED | OUTPATIENT
Start: 2024-06-19

## 2024-06-19 NOTE — PROGRESS NOTES
SUBJECTIVE:    Patient ID: Korey Aguero is a 64 y.o. male.    Chief Complaint: foot wound  (Complains of left foot wound that started out as a patchy dry spot for 1 mo//no med bottles//tc)    Has a left foot wound on with a patchy dry spot for the last month. Has had before in the past, used to put a cream on it that made it go away, can't remember the name.         No visits with results within 6 Month(s) from this visit.   Latest known visit with results is:   Office Visit on 01/27/2023   Component Date Value Ref Range Status    Glucose 07/27/2023 95  65 - 99 mg/dL Final    BUN 07/27/2023 14  7 - 25 mg/dL Final    Creatinine 07/27/2023 0.84  0.70 - 1.35 mg/dL Final    eGFR 07/27/2023 99  > OR = 60 mL/min/1.73m2 Final    BUN/Creatinine Ratio 07/27/2023 NOT APPLICABLE  6 - 22 (calc) Final    Sodium 07/27/2023 142  135 - 146 mmol/L Final    Potassium 07/27/2023 4.6  3.5 - 5.3 mmol/L Final    Chloride 07/27/2023 106  98 - 110 mmol/L Final    CO2 07/27/2023 29  20 - 32 mmol/L Final    Calcium 07/27/2023 9.6  8.6 - 10.3 mg/dL Final    Total Protein 07/27/2023 6.6  6.1 - 8.1 g/dL Final    Albumin 07/27/2023 4.2  3.6 - 5.1 g/dL Final    Globulin, Total 07/27/2023 2.4  1.9 - 3.7 g/dL (calc) Final    Albumin/Globulin Ratio 07/27/2023 1.8  1.0 - 2.5 (calc) Final    Total Bilirubin 07/27/2023 0.7  0.2 - 1.2 mg/dL Final    Alkaline Phosphatase 07/27/2023 67  35 - 144 U/L Final    AST 07/27/2023 19  10 - 35 U/L Final    ALT 07/27/2023 23  9 - 46 U/L Final    Cholesterol 07/27/2023 185  <200 mg/dL Final    HDL 07/27/2023 51  > OR = 40 mg/dL Final    Triglycerides 07/27/2023 138  <150 mg/dL Final    LDL Cholesterol 07/27/2023 108 (H)  mg/dL (calc) Final    HDL/Cholesterol Ratio 07/27/2023 3.6  <5.0 (calc) Final    Non HDL Chol. (LDL+VLDL) 07/27/2023 134 (H)  <130 mg/dL (calc) Final    Creatinine, Urine 07/27/2023 413 (H)  20 - 320 mg/dL Final    Microalb, Ur 07/27/2023 1.3  See Note: mg/dL Final    Microalb/Creat Ratio  07/27/2023 3  <30 mcg/mg creat Final    TSH w/reflex to FT4 07/27/2023 0.61  0.40 - 4.50 mIU/L Final    Color, UA 07/27/2023 DARK YELLOW  YELLOW Final    Appearance, UA 07/27/2023 CLEAR  CLEAR Final    Specific Gravity, UA 07/27/2023 1.033  1.001 - 1.035 Final    pH, UA 07/27/2023 5.5  5.0 - 8.0 Final    Glucose, UA 07/27/2023 NEGATIVE  NEGATIVE Final    Bilirubin, UA 07/27/2023 NEGATIVE  NEGATIVE Final    Ketones, UA 07/27/2023 TRACE (A)  NEGATIVE Final    Occult Blood UA 07/27/2023 NEGATIVE  NEGATIVE Final    Protein, UA 07/27/2023 TRACE (A)  NEGATIVE Final    Nitrite, UA 07/27/2023 NEGATIVE  NEGATIVE Final    Leukocytes, UA 07/27/2023 TRACE (A)  NEGATIVE Final    WBC Casts, UA 07/27/2023 NONE SEEN  < OR = 5 /HPF Final    RBC Casts, UA 07/27/2023 NONE SEEN  < OR = 2 /HPF Final    Squam Epithel, UA 07/27/2023 NONE SEEN  < OR = 5 /HPF Final    Bacteria, UA 07/27/2023 NONE SEEN  NONE SEEN /HPF Final    Ca Oxalate [place], UA 07/27/2023 MANY (A)  NONE OR FEW /HPF Final    Hyaline Casts, UA 07/27/2023 0-5 (A)  NONE SEEN /LPF Final    Service Cmt: 07/27/2023    Final    Reflexive Urine Culture 07/27/2023    Final    Urine Culture, Routine 07/27/2023    Final    WBC 07/27/2023 7.4  3.8 - 10.8 Thousand/uL Final    RBC 07/27/2023 4.43  4.20 - 5.80 Million/uL Final    Hemoglobin 07/27/2023 14.2  13.2 - 17.1 g/dL Final    Hematocrit 07/27/2023 40.4  38.5 - 50.0 % Final    MCV 07/27/2023 91.2  80.0 - 100.0 fL Final    MCH 07/27/2023 32.1  27.0 - 33.0 pg Final    MCHC 07/27/2023 35.1  32.0 - 36.0 g/dL Final    RDW 07/27/2023 13.7  11.0 - 15.0 % Final    Platelets 07/27/2023 270  140 - 400 Thousand/uL Final    MPV 07/27/2023 10.3  7.5 - 12.5 fL Final    Neutrophils, Abs 07/27/2023 3,929  1,500 - 7,800 cells/uL Final    Lymph # 07/27/2023 2,731  850 - 3,900 cells/uL Final    Mono # 07/27/2023 585  200 - 950 cells/uL Final    Eos # 07/27/2023 126  15 - 500 cells/uL Final    Baso # 07/27/2023 30  0 - 200 cells/uL Final    Neutrophils  Relative 07/27/2023 53.1  % Final    Lymph % 07/27/2023 36.9  % Final    Mono % 07/27/2023 7.9  % Final    Eosinophil % 07/27/2023 1.7  % Final    Basophil % 07/27/2023 0.4  % Final       Past Medical History:   Diagnosis Date    On mechanically assisted ventilation 12/13/2020     Social History     Socioeconomic History    Marital status:    Tobacco Use    Smoking status: Never    Smokeless tobacco: Never     Social Determinants of Health     Financial Resource Strain: Low Risk  (1/29/2024)    Overall Financial Resource Strain (CARDIA)     Difficulty of Paying Living Expenses: Not hard at all   Food Insecurity: No Food Insecurity (1/29/2024)    Hunger Vital Sign     Worried About Running Out of Food in the Last Year: Never true     Ran Out of Food in the Last Year: Never true   Transportation Needs: No Transportation Needs (1/29/2024)    PRAPARE - Transportation     Lack of Transportation (Medical): No     Lack of Transportation (Non-Medical): No   Physical Activity: Sufficiently Active (1/29/2024)    Exercise Vital Sign     Days of Exercise per Week: 4 days     Minutes of Exercise per Session: 60 min   Stress: No Stress Concern Present (1/29/2024)    [employer] [place] [employer] - Occupational Stress Questionnaire     Feeling of Stress : Only a little   Housing Stability: Low Risk  (1/29/2024)    Housing Stability Vital Sign     Unable to Pay for Housing in the Last Year: No     Number of Places Lived in the Last Year: 1     Unstable Housing in the Last Year: No     History reviewed. No pertinent surgical history.  No family history on file.    Review of patient's allergies indicates:   Allergen Reactions    Adhesive      Band-aide    Pcn [penicillins]        Current Outpatient Medications:     amLODIPine (NORVASC) 10 MG tablet, Take 1 tablet (10 mg total) by mouth once daily., Disp: 90 tablet, Rfl: 3    apple cider vinegar 300 mg Tab, Take by mouth., Disp: , Rfl:     benazepriL (LOTENSIN) 10 MG  "tablet, Take 1 tablet (10 mg total) by mouth once daily., Disp: 90 tablet, Rfl: 3    esomeprazole (NEXIUM) 20 MG capsule, Take 20 mg by mouth before breakfast., Disp: , Rfl:     vitamin D (VITAMIN D3) 1000 units Tab, Take 1,000 Units by mouth once daily., Disp: , Rfl:     indomethacin (INDOCIN SR) 75 mg CpSR CR capsule, Take 1 capsule (75 mg total) by mouth 2 (two) times daily with meals. As needed for pain (Patient not taking: Reported on 6/19/2024), Disp: 30 capsule, Rfl: 0    ketoconazole (NIZORAL) 2 % cream, Apply topically once daily., Disp: 30 g, Rfl: 0    Review of Systems   Constitutional:  Negative for fever.   Respiratory:  Negative for shortness of breath.    Cardiovascular:  Negative for chest pain.   Skin:  Positive for rash.          Objective:      Vitals:    06/19/24 1556   BP: 104/62   Pulse: 86   Weight: 108 kg (238 lb)   Height: 5' 8" (1.727 m)     Physical Exam  Constitutional:       General: He is not in acute distress.     Appearance: Normal appearance.   Musculoskeletal:        Feet:    Neurological:      Mental Status: He is alert.           Assessment:       1. Dermatitis         Plan:       Dermatitis  Comments:  Acute. To keep dry and covered when wearing tennis.  Orders:  -     ketoconazole (NIZORAL) 2 % cream; Apply topically once daily.  Dispense: 30 g; Refill: 0      Follow up if symptoms worsen or fail to improve, for As scheduled.        6/19/2024 Nader Valiente"

## 2024-07-02 DIAGNOSIS — L30.9 DERMATITIS: Primary | ICD-10-CM

## 2024-07-02 NOTE — PROGRESS NOTES
Pt called and reported that the wound on his foot is spreading and is having some swelling, despite cream given. Will refer to Derm.  Call pt and let him know that referred him to:  Rosy Owens MD  [address] [address]  Phone: [phone]  Fax: [phone]

## 2024-07-16 ENCOUNTER — TELEPHONE (OUTPATIENT)
Dept: FAMILY MEDICINE | Facility: CLINIC | Age: 64
End: 2024-07-16
Payer: COMMERCIAL

## 2024-07-27 NOTE — PROGRESS NOTES
SUBJECTIVE:    Patient ID: Korey Aguero is a 64 y.o. male.    Chief Complaint: Follow-up (6 mo f/u//no med bottles//no complaints//tc)    Pt here to checkup on acute and chronic conditions.      BP is doing ok.  Denies CP/SOB. Has been walking 3 miles daily, and now cleaning vehicles. Not doing weights as much.     Right arm rash is not bothering him right now.  Uses topical steroid when needed.   Had a rash on his foot that cleared up after using cream on it.     Taking vitamin D, E, and C.    Taking nexium every day. Not having heartburn.      Has been having issues with hemorrhoids.  Has been constipated.   ([facility]) nifedipine 2%/lidocaine 1%.      Has not had labs done.  Plans to do this morning.   -------------------------------------------  Cscope, hx polyp,  March 2022 ([facility])  Has had eye exam. ([facility])            Past Medical History:   Diagnosis Date    On mechanically assisted ventilation 12/13/2020     Social History     Socioeconomic History    Marital status:    Tobacco Use    Smoking status: Never    Smokeless tobacco: Never     Social Determinants of Health     Financial Resource Strain: Low Risk  (1/29/2024)    Overall Financial Resource Strain (CARDIA)     Difficulty of Paying Living Expenses: Not hard at all   Food Insecurity: No Food Insecurity (1/29/2024)    Hunger Vital Sign     Worried About Running Out of Food in the Last Year: Never true     Ran Out of Food in the Last Year: Never true   Transportation Needs: No Transportation Needs (1/29/2024)    PRAPARE - Transportation     Lack of Transportation (Medical): No     Lack of Transportation (Non-Medical): No   Physical Activity: Sufficiently Active (1/29/2024)    Exercise Vital Sign     Days of Exercise per Week: 4 days     Minutes of Exercise per Session: 60 min   Stress: No Stress Concern Present (1/29/2024)    [employer] [place] [employer] - Occupational Stress Questionnaire     Feeling of Stress : Only a little    Housing Stability: Low Risk  (1/29/2024)    Housing Stability Vital Sign     Unable to Pay for Housing in the Last Year: No     Number of Places Lived in the Last Year: 1     Unstable Housing in the Last Year: No     No past surgical history on file.  No family history on file.    Review of patient's allergies indicates:   Allergen Reactions    Adhesive      Band-aide    Pcn [penicillins]        Current Outpatient Medications:     amLODIPine (NORVASC) 10 MG tablet, Take 1 tablet (10 mg total) by mouth once daily., Disp: 90 tablet, Rfl: 3    apple cider vinegar 300 mg Tab, Take by mouth., Disp: , Rfl:     benazepriL (LOTENSIN) 10 MG tablet, Take 1 tablet (10 mg total) by mouth once daily., Disp: 90 tablet, Rfl: 3    esomeprazole (NEXIUM) 20 MG capsule, Take 20 mg by mouth before breakfast., Disp: , Rfl:     ketoconazole (NIZORAL) 2 % cream, Apply topically once daily., Disp: 30 g, Rfl: 0    vitamin D (VITAMIN D3) 1000 units Tab, Take 1,000 Units by mouth once daily., Disp: , Rfl:     indomethacin (INDOCIN SR) 75 mg CpSR CR capsule, Take 1 capsule (75 mg total) by mouth 2 (two) times daily with meals. As needed for pain (Patient not taking: Reported on 6/19/2024), Disp: 30 capsule, Rfl: 0    Review of Systems   Constitutional:  Negative for activity change, appetite change and unexpected weight change.   HENT:  Negative for hearing loss, rhinorrhea and trouble swallowing.    Eyes:  Negative for discharge and visual disturbance.   Respiratory:  Negative for chest tightness, shortness of breath and wheezing.    Cardiovascular:  Negative for chest pain, palpitations and leg swelling.   Gastrointestinal:  Negative for blood in stool, constipation, diarrhea and vomiting.        -heartburn   Endocrine: Negative for polydipsia and polyuria.   Genitourinary:  Negative for decreased urine volume, difficulty urinating, dysuria, frequency, hematuria and urgency.   Musculoskeletal:  Negative for arthralgias, back pain, joint  "swelling and neck pain.   Neurological:  Negative for dizziness, weakness and headaches.   Hematological:  Does not bruise/bleed easily.   Psychiatric/Behavioral:  Negative for confusion, dysphoric mood, sleep disturbance and suicidal ideas. The patient is not nervous/anxious.    All other systems reviewed and are negative.         Objective:      Vitals:    07/29/24 0817   BP: 118/72   Pulse: (!) 50   Weight: 105.7 kg (233 lb)   Height: 5' 8" (1.727 m)           Wt Readings from Last 3 Encounters:   07/29/24 105.7 kg (233 lb)   06/19/24 108 kg (238 lb)   01/29/24 105.2 kg (232 lb)       Physical Exam  Vitals reviewed.   Constitutional:       General: He is not in acute distress.     Appearance: Normal appearance. He is well-developed. He is obese.   HENT:      Head: Normocephalic and atraumatic.   Neck:      Thyroid: No thyromegaly.   Cardiovascular:      Rate and Rhythm: Normal rate and regular rhythm.      Heart sounds: Normal heart sounds. No murmur heard.     No friction rub.   Pulmonary:      Effort: Pulmonary effort is normal.      Breath sounds: Normal breath sounds. No wheezing or rales.   Abdominal:      General: Bowel sounds are normal. There is no distension.      Palpations: Abdomen is soft.      Tenderness: There is no abdominal tenderness.   Musculoskeletal:      Cervical back: Neck supple.   Lymphadenopathy:      Cervical: No cervical adenopathy.   Skin:     General: Skin is warm and dry.      Findings: No rash.   Neurological:      Mental Status: He is alert and oriented to person, place, and time.      Motor: Motor function is intact.      Coordination: Coordination is intact.      Gait: Gait is intact.   Psychiatric:         Attention and Perception: He is attentive.         Speech: Speech normal.         Behavior: Behavior normal.         Thought Content: Thought content normal.         Judgment: Judgment normal.           Assessment:       1. HTN (hypertension), benign    2. Gastroesophageal " reflux disease without esophagitis    3. Dermatitis    4. Other constipation                 Plan:       HTN (hypertension), benign  Comments:  Controlled. Will continue to monitor BP on current medication regimen    Gastroesophageal reflux disease without esophagitis  Comments:  Controlled. Will continue to monitor symptoms on ppi    Dermatitis  Comments:  Controlled. Will continue to monitor symptoms    Other constipation  Comments:  Symptomatic.  Recommended otc colace.      Labs and/or tests have been ordered for the evaluation/monitoring of acute/chronic conditions, to be done today.    Follow up in about 6 months (around 1/29/2025) for HTN, GERD.        7/29/2024 Nader Valiente

## 2024-07-29 ENCOUNTER — OFFICE VISIT (OUTPATIENT)
Dept: FAMILY MEDICINE | Facility: CLINIC | Age: 64
End: 2024-07-29
Payer: COMMERCIAL

## 2024-07-29 VITALS
WEIGHT: 233 LBS | HEART RATE: 50 BPM | DIASTOLIC BLOOD PRESSURE: 72 MMHG | HEIGHT: 68 IN | BODY MASS INDEX: 35.31 KG/M2 | SYSTOLIC BLOOD PRESSURE: 118 MMHG

## 2024-07-29 DIAGNOSIS — K21.9 GASTROESOPHAGEAL REFLUX DISEASE WITHOUT ESOPHAGITIS: ICD-10-CM

## 2024-07-29 DIAGNOSIS — L30.9 DERMATITIS: ICD-10-CM

## 2024-07-29 DIAGNOSIS — K59.09 OTHER CONSTIPATION: ICD-10-CM

## 2024-07-29 DIAGNOSIS — I10 HTN (HYPERTENSION), BENIGN: Primary | ICD-10-CM

## 2024-07-29 PROCEDURE — 3074F SYST BP LT 130 MM HG: CPT | Mod: CPTII,S$GLB,, | Performed by: FAMILY MEDICINE

## 2024-07-29 PROCEDURE — 3008F BODY MASS INDEX DOCD: CPT | Mod: CPTII,S$GLB,, | Performed by: FAMILY MEDICINE

## 2024-07-29 PROCEDURE — 99214 OFFICE O/P EST MOD 30 MIN: CPT | Mod: S$GLB,,, | Performed by: FAMILY MEDICINE

## 2024-07-29 PROCEDURE — 1160F RVW MEDS BY RX/DR IN RCRD: CPT | Mod: CPTII,S$GLB,, | Performed by: FAMILY MEDICINE

## 2024-07-29 PROCEDURE — 1159F MED LIST DOCD IN RCRD: CPT | Mod: CPTII,S$GLB,, | Performed by: FAMILY MEDICINE

## 2024-07-29 PROCEDURE — 3078F DIAST BP <80 MM HG: CPT | Mod: CPTII,S$GLB,, | Performed by: FAMILY MEDICINE

## 2024-07-29 PROCEDURE — 4010F ACE/ARB THERAPY RXD/TAKEN: CPT | Mod: CPTII,S$GLB,, | Performed by: FAMILY MEDICINE

## 2024-08-01 ENCOUNTER — TELEPHONE (OUTPATIENT)
Dept: FAMILY MEDICINE | Facility: CLINIC | Age: 64
End: 2024-08-01
Payer: COMMERCIAL

## 2024-08-01 DIAGNOSIS — R97.20 ELEVATED PSA: Primary | ICD-10-CM

## 2024-08-08 ENCOUNTER — OFFICE VISIT (OUTPATIENT)
Dept: UROLOGY | Facility: CLINIC | Age: 64
End: 2024-08-08
Payer: COMMERCIAL

## 2024-08-08 VITALS — WEIGHT: 235 LBS | BODY MASS INDEX: 35.61 KG/M2 | HEIGHT: 68 IN

## 2024-08-08 DIAGNOSIS — N52.8 OTHER MALE ERECTILE DYSFUNCTION: ICD-10-CM

## 2024-08-08 DIAGNOSIS — R97.20 ELEVATED PSA: Primary | ICD-10-CM

## 2024-08-08 PROCEDURE — 4010F ACE/ARB THERAPY RXD/TAKEN: CPT | Mod: CPTII,S$GLB,, | Performed by: UROLOGY

## 2024-08-08 PROCEDURE — 99204 OFFICE O/P NEW MOD 45 MIN: CPT | Mod: S$GLB,,, | Performed by: UROLOGY

## 2024-08-08 PROCEDURE — 1159F MED LIST DOCD IN RCRD: CPT | Mod: CPTII,S$GLB,, | Performed by: UROLOGY

## 2024-08-08 PROCEDURE — 99999 PR PBB SHADOW E&M-EST. PATIENT-LVL III: CPT | Mod: PBBFAC,,, | Performed by: UROLOGY

## 2024-08-08 PROCEDURE — 3008F BODY MASS INDEX DOCD: CPT | Mod: CPTII,S$GLB,, | Performed by: UROLOGY

## 2024-08-08 PROCEDURE — 3066F NEPHROPATHY DOC TX: CPT | Mod: CPTII,S$GLB,, | Performed by: UROLOGY

## 2024-08-08 PROCEDURE — 3061F NEG MICROALBUMINURIA REV: CPT | Mod: CPTII,S$GLB,, | Performed by: UROLOGY

## 2024-08-08 PROCEDURE — 1160F RVW MEDS BY RX/DR IN RCRD: CPT | Mod: CPTII,S$GLB,, | Performed by: UROLOGY

## 2024-08-08 RX ORDER — TADALAFIL 10 MG/1
10 TABLET ORAL DAILY PRN
Qty: 30 TABLET | Refills: 11 | Status: SHIPPED | OUTPATIENT
Start: 2024-08-08 | End: 2025-08-08

## 2024-08-16 DIAGNOSIS — J01.00 ACUTE NON-RECURRENT MAXILLARY SINUSITIS: Primary | ICD-10-CM

## 2024-08-16 RX ORDER — AZITHROMYCIN 250 MG/1
TABLET, FILM COATED ORAL
Qty: 6 TABLET | Refills: 0 | Status: SHIPPED | OUTPATIENT
Start: 2024-08-16 | End: 2024-08-21

## 2024-08-29 ENCOUNTER — LAB VISIT (OUTPATIENT)
Dept: LAB | Facility: HOSPITAL | Age: 64
End: 2024-08-29
Attending: UROLOGY
Payer: COMMERCIAL

## 2024-08-29 DIAGNOSIS — R97.20 ELEVATED PSA: Primary | ICD-10-CM

## 2024-08-29 LAB — COMPLEXED PSA SERPL-MCNC: 7.3 NG/ML (ref 0–4)

## 2024-08-29 PROCEDURE — 84153 ASSAY OF PSA TOTAL: CPT | Performed by: UROLOGY

## 2024-08-29 PROCEDURE — 36415 COLL VENOUS BLD VENIPUNCTURE: CPT | Performed by: UROLOGY

## 2024-09-06 ENCOUNTER — HOSPITAL ENCOUNTER (OUTPATIENT)
Dept: RADIOLOGY | Facility: HOSPITAL | Age: 64
Discharge: HOME | End: 2024-09-06
Attending: UROLOGY
Payer: COMMERCIAL

## 2024-09-06 DIAGNOSIS — R97.20 ELEVATED PSA: ICD-10-CM

## 2024-09-06 PROCEDURE — 72197 MRI PELVIS W/O & W/DYE: CPT | Mod: 26,,, | Performed by: RADIOLOGY

## 2024-09-06 PROCEDURE — 72197 MRI PELVIS W/O & W/DYE: CPT | Mod: TC

## 2024-09-06 PROCEDURE — 25500020 PHARM REV CODE 255

## 2024-09-06 PROCEDURE — A9585 GADOBUTROL INJECTION: HCPCS

## 2024-09-06 RX ORDER — GADOBUTROL 604.72 MG/ML
INJECTION INTRAVENOUS
Status: COMPLETED
Start: 2024-09-06 | End: 2024-09-06

## 2024-09-06 RX ADMIN — GADOBUTROL 10 ML: 604.72 INJECTION INTRAVENOUS at 06:09

## 2024-09-16 DIAGNOSIS — R97.20 ELEVATED PSA: Primary | ICD-10-CM

## 2024-09-16 RX ORDER — GENTAMICIN 40 MG/ML
80 INJECTION, SOLUTION INTRAMUSCULAR; INTRAVENOUS
Status: SHIPPED | OUTPATIENT
Start: 2024-09-16

## 2024-09-16 RX ORDER — CIPROFLOXACIN 500 MG/1
500 TABLET ORAL 2 TIMES DAILY
Qty: 6 TABLET | Refills: 0 | Status: SHIPPED | OUTPATIENT
Start: 2024-09-16 | End: 2024-09-19

## 2024-09-16 NOTE — PROGRESS NOTES
Procedure Order to Urology [[id number]]     Status: Active   Order placed as a reflex to PROSTATE SPECIFIC ANTIGEN, DIAGNOSTIC [[id number]] ordered on 08/08/24 at 0919   Ordering user: Jasmin Shaver Jr., MD 09/16/24 0856 Ordering provider: Jasmin Shaver Jr., MD   Authorized by: Jasmin Shaver Jr., MD Ordering mode: Standard   Frequency:  09/16/24 -     Diagnoses  Elevated PSA [R97.20]   Questionnaire    Question Answer   Procedure Prostate Biopsy Comment - URONAV   Pre-op Diagnosis Elevated PSA   Facility Name: [facility]   Order comments: Uronav prostate biopsy on 10/16/24.   ASC, please order local sedation, UA poct   preop IM Gentamyacin 80mg,160mg if over 300 lbs (no urine Dr shaver or Barb)

## 2024-10-12 DIAGNOSIS — B02.9 HERPES ZOSTER WITHOUT COMPLICATION: Primary | ICD-10-CM

## 2024-10-12 RX ORDER — VALACYCLOVIR HYDROCHLORIDE 1 G/1
1000 TABLET, FILM COATED ORAL 2 TIMES DAILY
Qty: 14 TABLET | Refills: 0 | Status: SHIPPED | OUTPATIENT
Start: 2024-10-12 | End: 2024-10-19

## 2024-10-12 RX ORDER — PREGABALIN 50 MG/1
50-100 CAPSULE ORAL 3 TIMES DAILY PRN
Qty: 30 CAPSULE | Refills: 1 | Status: SHIPPED | OUTPATIENT
Start: 2024-10-12

## 2024-10-14 ENCOUNTER — TELEPHONE (OUTPATIENT)
Dept: UROLOGY | Facility: CLINIC | Age: 64
End: 2024-10-14
Payer: COMMERCIAL

## 2024-10-14 NOTE — TELEPHONE ENCOUNTER
----- Message from Korey sent at 10/14/2024  2:14 PM CDT -----  Type: Needs Medical Advice  Who Called:  pt  Pharmacy name and phone #:    Best Call Back Number: [phone]  Additional Information: pt is calling the office because he have a biopsy done on Wednesday but caught shingles, asking if that will be a issue or interfere with his procedure please calll back to advise thanks

## 2024-10-14 NOTE — TELEPHONE ENCOUNTER
Spoke with pt. Pt stated that he have shingles, started his meds Saturday, and his procedure is 10/16. Informed pt that I will route the message to Dr. Shaver. Pt verbalized understanding.

## 2024-10-15 NOTE — TELEPHONE ENCOUNTER
Spoke with pt. Gave recommendations for procedure on tomorrow from Dr. Shaver. Pt verbalized understanding.

## 2024-10-16 DIAGNOSIS — R97.20 ELEVATED PSA: ICD-10-CM

## 2024-10-16 DIAGNOSIS — R97.20 ELEVATED PSA: Primary | ICD-10-CM

## 2024-10-16 RX ORDER — GENTAMICIN 40 MG/ML
80 INJECTION, SOLUTION INTRAMUSCULAR; INTRAVENOUS
OUTPATIENT
Start: 2024-10-16

## 2024-10-16 RX ORDER — CIPROFLOXACIN 500 MG/1
500 TABLET ORAL 2 TIMES DAILY
Qty: 6 TABLET | Refills: 0 | Status: SHIPPED | OUTPATIENT
Start: 2024-10-16 | End: 2024-10-19

## 2024-10-16 NOTE — PROGRESS NOTES
Procedure Order to Urology [[id number]]    Electronically signed by: Jasmin Shaver Jr., MD on 10/16/24 1320 Status: Active   Ordering user: Jasmin Shaver Jr., MD 10/16/24 1320 Authorized by: Jasmin Shaver Jr., MD   Ordering mode: Standard   Frequency:     Diagnoses  Elevated PSA [R97.20]   Questionnaire    Question Answer   Procedure Prostate Biopsy   Pre-op Diagnosis Elevated prostate specific antigen (PSA)   Facility Name: [facility]   Order comments: Uronav prostate biopsy on 11/6/24   ASC, please order local sedation, UA poct   preop IM Gentamyacin 80mg,160mg if over 300 lbs (no urine Dr shaver or Barb)

## 2024-11-06 ENCOUNTER — HOSPITAL ENCOUNTER (OUTPATIENT)
Facility: HOSPITAL | Age: 64
Discharge: HOME | End: 2024-11-06
Attending: UROLOGY | Admitting: UROLOGY
Payer: COMMERCIAL

## 2024-11-06 DIAGNOSIS — R97.20 ELEVATED PSA: Primary | ICD-10-CM

## 2024-11-06 PROCEDURE — 55700 PR BIOPSY OF PROSTATE,NEEDLE/PUNCH: CPT | Mod: ,,, | Performed by: UROLOGY

## 2024-11-06 PROCEDURE — 55700 HC PROSTATE NEEDLE BIOPSY: CPT | Performed by: UROLOGY

## 2024-11-06 PROCEDURE — 76872 US TRANSRECTAL: CPT | Mod: 26,,, | Performed by: UROLOGY

## 2024-11-06 PROCEDURE — 76872 US TRANSRECTAL: CPT | Performed by: UROLOGY

## 2024-11-06 PROCEDURE — 63600175 PHARM REV CODE 636 W HCPCS: Performed by: UROLOGY

## 2024-11-06 RX ORDER — KETOCONAZOLE 20 MG/G
1 CREAM TOPICAL DAILY
COMMUNITY

## 2024-11-06 RX ORDER — GENTAMICIN 40 MG/ML
80 INJECTION, SOLUTION INTRAMUSCULAR; INTRAVENOUS
Status: DISCONTINUED | OUTPATIENT
Start: 2024-11-06 | End: 2024-11-06 | Stop reason: HOSPADM

## 2024-11-06 RX ORDER — LIDOCAINE HYDROCHLORIDE 20 MG/ML
INJECTION, SOLUTION EPIDURAL; INFILTRATION; INTRACAUDAL; PERINEURAL
Status: DISCONTINUED | OUTPATIENT
Start: 2024-11-06 | End: 2024-11-06 | Stop reason: HOSPADM

## 2024-11-06 RX ADMIN — GENTAMICIN SULFATE 80 MG: 40 INJECTION, SOLUTION INTRAMUSCULAR; INTRAVENOUS at 12:11

## 2024-11-06 NOTE — DISCHARGE INSTRUCTIONS
After your prostate biopsy    Avoid sexual activity,lifting, strenuous physical activity or exertion for 3 days     No riding mowers, tractors, bicycles, motorcycles for 2-3 weeks    You may experience blood in your urine or stool for up to 2 weeks and in your semen for up to 6 weeks.  This is a normal side effect of the procedure and will resolve.    Drink plenty of water    Take antibiotics as prescribed    If you experience any of the following conditions, please return immediately to the clinic (during office hrs) or the Emergency Room if after hours:       Fever       Inabiltiy to urinate       Severe bleeding    You may resume aspirin, anti inflammatory and blood thinners in 3 days    Results take at minimum 10 business days.  A 2 week follow up will be scheduled to review pathology reports in the clinic    During office hours, please call  and ask to speak with the nurse if you have any questions.  If after hours, call the [facility] On [facility] # to be connectied to the doctor on call    After Surgery:  Always be aware that any surgery can cause these symptoms:    Pain- Medication can be prescribed for pain to decrease your pain but may not completely take your pain away.  Over the Counter pain medicine my be enough and you can always use Ice and rest to help ease pain.    Bleeding- a little bleeding after a surgery is usually within normal.  If there is a lot of blood you need to notify your MD.  Emergency treatments of bleeding are cold application, elevation of the bleeding site and compression.    Infection- Infection after surgery is NOT a normal occurrence.  Signs of infection are fever, swelling, hot to touch the incision.  If this occurs notify your MD immediately.    Nausea- this can be common after a surgery especially if you have had anesthesia medicine or are taking pain medicine.  Staying on clear liquids, bland foods, gingerale, or over the counter anti nausea medicines can help.  If you  vomit more than once, notify your MD.  Anti Nausea medicines can be prescribed.

## 2024-11-06 NOTE — H&P
"[facility] New Patient/H&P:     Korey Aguero is a 64 y.o. male who presents for elevated PSA and erectile dysfunction.     Patient with a history of HTN and gout who was referred for an elevated PSA. On review of records, his PSA is 6.69. No prior PSA for comparison. States he has never been told that he had an elevated PSA in the past.      No known family history of prostate cancer. Mother and two sisters with breast cancer.      He reports mild lower urinary tract symptoms - intermittency, urgency and nocturia x 2. Not bothered by his symptoms and not on any medication. Urinalysis negative.      Patient also with a several year history of erectile dysfunction. He has trouble obtaining and maintaining an erection. Has tried Viagra, but he had tachycardia so discontinued.      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 Never smoked.      Denies any fever, chills, gross hematuria, flank pain, bone pain, unintentional weight loss,  trauma or history of  malignancy.         PSA  6.69                 7/29/24     IPSSQoL  828/8/24             Past Medical History:   Diagnosis Date    Hypertension      On mechanically assisted ventilation 12/13/2020               Past Surgical History:   Procedure Laterality Date    elbow surgery Left                  Family History   Problem Relation Name Age of Onset    Diabetes Father        Breast cancer Mother        Breast cancer Sister        Breast cancer Sister                   Review of patient's allergies indicates:   Allergen Reactions    Adhesive         Band-aide    Pcn [penicillins]           Medications Reviewed: see MAR        FOCUSED PHYSICAL EXAM:     There were no vitals filed for this visit.  Body mass index is 35.73 kg/m². Weight: 106.6 kg (235 lb) Height: 5' 8" (172.7 cm)         General: Alert, cooperative, no distress, appears stated age  Abdomen: Soft, non-tender, no CVA tenderness, non-distended        LABS:     Recent Results         Recent " Results (from the past 336 hour(s))   TSH w/reflex to FT4     Collection Time: 07/29/24 12:00 AM   Result Value Ref Range     TSH w/reflex to FT4 1.03 0.40 - 4.50 mIU/L   Urinalysis, Reflex to Urine Culture Urine, Clean Catch     Collection Time: 07/29/24 12:00 AM     Specimen: Urine   Result Value Ref Range     Color, UA YELLOW YELLOW     Appearance, UA CLEAR CLEAR     Specific Gravity, UA 1.023 1.001 - 1.035     pH, UA 5.5 5.0 - 8.0     Glucose, UA NEGATIVE NEGATIVE     Bilirubin, UA NEGATIVE NEGATIVE     Ketones, UA NEGATIVE NEGATIVE     Occult Blood UA NEGATIVE NEGATIVE     Protein, UA NEGATIVE NEGATIVE     Nitrite, UA NEGATIVE NEGATIVE     Leukocytes, UA NEGATIVE NEGATIVE     WBC Casts, UA NONE SEEN < OR = 5 /HPF     RBC Casts, UA 0-2 < OR = 2 /HPF     Squam Epithel, UA NONE SEEN < OR = 5 /HPF     Bacteria, UA NONE SEEN NONE SEEN /HPF     Hyaline Casts, UA NONE SEEN NONE SEEN /LPF     Service Cmt: SEE COMMENT       Reflexive Urine Culture SEE COMMENT     CBC Auto Differential     Collection Time: 07/29/24 12:00 AM   Result Value Ref Range     WBC 6.9 3.8 - 10.8 Thousand/uL     RBC 4.82 4.20 - 5.80 Million/uL     Hemoglobin 14.6 13.2 - 17.1 g/dL     Hematocrit 46.0 38.5 - 50.0 %     MCV 95.4 80.0 - 100.0 fL     MCH 30.3 27.0 - 33.0 pg     MCHC 31.7 (L) 32.0 - 36.0 g/dL     RDW 13.1 11.0 - 15.0 %     Platelets 293 140 - 400 Thousand/uL     MPV 10.4 7.5 - 12.5 fL     Neutrophils, Abs 3,547 1,500 - 7,800 cells/uL     Lymph # 2,670 850 - 3,900 cells/uL     Mono # 518 200 - 950 cells/uL     Eos # 138 15 - 500 cells/uL     Baso # 28 0 - 200 cells/uL     Neutrophils Relative 51.4 %     Lymph % 38.7 %     Mono % 7.5 %     Eosinophil % 2.0 %     Basophil % 0.4 %   Lipid Panel     Collection Time: 07/29/24 12:00 AM   Result Value Ref Range     Cholesterol 200 (H) <200 mg/dL     HDL 50 > OR = 40 mg/dL     Triglycerides 120 <150 mg/dL     LDL Cholesterol 127 (H) mg/dL (calc)     HDL/Cholesterol Ratio 4.0 <5.0 (calc)     Non  HDL Chol. (LDL+VLDL) 150 (H) <130 mg/dL (calc)   Comprehensive Metabolic Panel     Collection Time: 07/29/24 12:00 AM   Result Value Ref Range     Glucose 107 (H) 65 - 99 mg/dL     BUN 16 7 - 25 mg/dL     Creatinine 0.88 0.70 - 1.35 mg/dL     eGFR 97 > OR = 60 mL/min/1.73m2     BUN/Creatinine Ratio SEE NOTE: 6 - 22 (calc)     Sodium 142 135 - 146 mmol/L     Potassium 4.6 3.5 - 5.3 mmol/L     Chloride 105 98 - 110 mmol/L     CO2 27 20 - 32 mmol/L     Calcium 9.4 8.6 - 10.3 mg/dL     Total Protein 6.5 6.1 - 8.1 g/dL     Albumin 4.3 3.6 - 5.1 g/dL     Globulin, Total 2.2 1.9 - 3.7 g/dL (calc)     Albumin/Globulin Ratio 2.0 1.0 - 2.5 (calc)     Total Bilirubin 0.6 0.2 - 1.2 mg/dL     Alkaline Phosphatase 75 35 - 144 U/L     AST 17 10 - 35 U/L     ALT 19 9 - 46 U/L   Microalbumin/Creatinine Ratio, Urine     Collection Time: 07/29/24 12:00 AM   Result Value Ref Range     Creatinine, Urine 231 20 - 320 mg/dL     Microalb, Ur 0.5 See Note: mg/dL     Microalb/Creat Ratio 2 <30 mg/g creat   PSA, Screening     Collection Time: 07/29/24 12:00 AM   Result Value Ref Range     PROSTATE SPECIFIC ANTIGEN, SCR - [facility] 6.69 (H) < OR = 4.00 ng/mL                  Assessment/Diagnosis:     1. Elevated PSA  Ambulatory referral/consult to Urology     PROSTATE SPECIFIC ANTIGEN, DIAGNOSTIC       2. Other male erectile dysfunction                Plans:     - Uronav prostate biopsy today.

## 2024-11-06 NOTE — OP NOTE
"[facility] Procedure/Discharge Note  MD: Jasmin Shaver Jr.     Procedure performed:   TRANSRECTAL PROSTATIC ULTRASOUND  TRANSRECTAL PROSTATE BIOPSY with ultrasound guidance and MRI fusion biopsy      Staff Surgeon: Jasmin Shaver Jr, MD  Date: 2024  Anesthesia: Local  EBL: Minimal  Complications: None    NAME:Korey Aguero  : 1960  Diagnosis:Elevated PSA    MRI findings:  Results for orders placed or performed during the hospital encounter of 24 (from the past 2160 hours)   MRI Prostate W W/O Contrast    Impression    1. PI-RADS 4.  Suspicious lesion in the right peripheral zone.  Recommend attention at biopsy.  2. Target lesion in close proximity to the right neurovascular bundle.  3. Benign prostatic hypertrophy.  4. Diffuse urinary bladder wall thickening with differential to include cystitis and chronic outlet obstruction.  Assessment Categories:    PIRADS 1 (clinically significant cancer is highly unlikely to be present)    PIRADS 2 (clinically significant cancer is unlikely to be present)    PIRADS 3 (the presence of clinically significant cancer is equivocal)    PIRADS 4 (clinically significant cancer is likely to be present)    PIRADS 5 (clinically significant cancer is highly likely to be present)    Number of targets created for potential MR/US fusion biopsy    Peripheral zone: 1    Transition zone: 0      Electronically signed by: Jan Augustin  Date:    2024  Time:    21:10         Current PSA: No results found for: "PSA", "PSATOTAL", "PSAFREE", "PSAFREEPCT"  Prostate size: 31 cc      Total number of cores taken: 15  Target lesion #1- 3  Right base lateral- 1  Right base medial - 1  Right mid lateral - 1  Right mid medial - 1  Right apex lateral - 1  Right apex medial - 1  Left base lateral - 1  Left base medial - 1  Left mid lateral - 1  Left mid medial - 1  Left apex lateral - 1  Left apex medial - 1      Procedure in Detail:      After informed consent was " obtained he was brought to the operating room.  He was given preoperative antibiotics.      He was placed in the left lateral decubitus position. Digital rectal exam performed.  The ultrasound probe was placed in the rectum and dynamic images were obtained of the entire prostate.     A sweep of the prostate was performed from base to apex in the transverse plane using the ultrasound and URONAV platform. Landmarks were then labeled with anterior, posterior, right, left, base and apex were labeled in the axial as well as sagittal planes. Segmentation was then performed and manual adjustments were made as needed starting in the sagittal plane followed by the axial plane. At this point, alignment of the ultrasound with MRI images was ensured using the toggle between ultrasound and MRI.      Each target lesion was sequentially highlighted and biopsies were obtained of each target.      A standard template prostate biopsy was then performed.       The biopsy specimens were fully submerged in formalin labeled with the patient's name and sent for pathological evaluation.      At completion of the procedure I removed the ultrasound probe.  Hemostasis appeared to be adequate.  He tolerated the procedure well and there were no complications.      Discharge home today status post uncomplicated procedure as above  Diet - resume home diet  Follow up: Follow up in 2 to 3 weeks to review pathology.   Instructions: RTC in 2 - 3 weeks to review prostate biopsy results.   Meds:     Medication List        ASK your doctor about these medications      amLODIPine 10 MG tablet  Commonly known as: NORVASC  Take 1 tablet (10 mg total) by mouth once daily.     apple cider vinegar 300 mg Tab     benazepriL 10 MG tablet  Commonly known as: LOTENSIN  Take 1 tablet (10 mg total) by mouth once daily.     ciprofloxacin HCl 500 MG tablet  Commonly known as: CIPRO     clobetasoL 0.05 % cream  Commonly known as: TEMOVATE  Aaa bid to skin x 2-3 weeks  prn flare     esomeprazole 20 MG capsule  Commonly known as: NEXIUM     ketoconazole 2 % cream  Commonly known as: NIZORAL     pregabalin 50 MG capsule  Commonly known as: LYRICA  Take 1-2 capsules ( mg total) by mouth 3 (three) times daily as needed (for pain).     tadalafiL 10 MG tablet  Commonly known as: CIALIS  Take 1 tablet (10 mg total) by mouth daily as needed.     valACYclovir 1000 MG tablet  Commonly known as: VALTREX  Take 1 tablet (1,000 mg total) by mouth 2 (two) times daily. for 7 days     vitamin D 1000 units Tab  Commonly known as: VITAMIN D3              Jasmin Shaver Jr, MD

## 2024-11-07 VITALS
DIASTOLIC BLOOD PRESSURE: 78 MMHG | HEIGHT: 68 IN | HEART RATE: 52 BPM | OXYGEN SATURATION: 100 % | RESPIRATION RATE: 18 BRPM | SYSTOLIC BLOOD PRESSURE: 157 MMHG | WEIGHT: 235 LBS | BODY MASS INDEX: 35.61 KG/M2 | TEMPERATURE: 98 F

## 2024-11-22 ENCOUNTER — OFFICE VISIT (OUTPATIENT)
Dept: UROLOGY | Facility: CLINIC | Age: 64
End: 2024-11-22
Payer: COMMERCIAL

## 2024-11-22 VITALS — WEIGHT: 234 LBS | HEIGHT: 68 IN | BODY MASS INDEX: 35.46 KG/M2

## 2024-11-22 DIAGNOSIS — R97.20 ELEVATED PSA: Primary | ICD-10-CM

## 2024-11-22 DIAGNOSIS — N52.8 OTHER MALE ERECTILE DYSFUNCTION: ICD-10-CM

## 2024-11-22 PROCEDURE — 99999 PR PBB SHADOW E&M-EST. PATIENT-LVL III: CPT | Mod: PBBFAC,,, | Performed by: UROLOGY

## 2024-11-22 NOTE — PROGRESS NOTES
[facility] Established Patient/H&P:    Korey Aguero is a 64 y.o. male who presents for follow up for elevated PSA and erectile dysfunction.     Patient with a history of HTN and gout who was referred for an elevated PSA. On review of records, his PSA is 6.69. No prior PSA for comparison. States he has never been told that he had an elevated PSA in the past.      No known family history of prostate cancer. Mother and two sisters with breast cancer.      He reports mild lower urinary tract symptoms - intermittency, urgency and nocturia x 2. Not bothered by his symptoms and not on any medication. Urinalysis negative.      Patient also with a several year history of erectile dysfunction. He has trouble obtaining and maintaining an erection. Has tried Viagra, but he had tachycardia so discontinued.      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       Interval History    11/22/24: Repeat PSA up to 7.3. MRI prostate on 9/6/24 with a PI-RADs 4 lesion in the right peripheral zone. He is now s/p Uronav prostate biopsy which was negative for malignancy on 11/6/24. Doing well after biopsy with no complaints. He was prescribed Cialis 10 mg at his initial visit in 8/2024. Good erectile function.     Denies any fever, chills, gross hematuria, flank pain, bone pain, unintentional weight loss,  trauma or history of  malignancy.         PSA  7.3  8/29/24  6.69                 7/29/24     IPSS QoL  8 2 8/8/24    Past Medical History:   Diagnosis Date    Hypertension     On mechanically assisted ventilation 12/13/2020    pneumonia       Past Surgical History:   Procedure Laterality Date    elbow surgery Left     TRANSRECTAL BIOPSY OF PROSTATE WITH ULTRASOUND GUIDANCE N/A 11/6/2024    Procedure: -uronav-BIOPSY, PROSTATE, RECTAL APPROACH, WITH US GUIDANCE;  Surgeon: Jasmin Shaver Jr., MD;  Location: [facility] OR;  Service: Urology;  Laterality: N/A;       Review of patient's allergies indicates:   Allergen  "Reactions    Adhesive      Band-aide    Pcn [penicillins]        Medications Reviewed: see MAR    FOCUSED PHYSICAL EXAM:    There were no vitals filed for this visit.  Body mass index is 35.58 kg/m². Weight: 106.1 kg (234 lb) Height: 5' 8" (172.7 cm)       General: Alert, cooperative, no distress, appears stated age  Abdomen: Soft, non-tender, no CVA tenderness, non-distended      LABS:    No results found for this or any previous visit (from the past 2 weeks).      Assessment/Diagnosis:    1. Elevated PSA  PROSTATE SPECIFIC ANTIGEN, DIAGNOSTIC      2. Other male erectile dysfunction            Plans:    - I spent 30 minutes of the day of this encounter preparing for, treating and managing this patient. Visit today included increased complexity associated with the care of the episodic problem addressed and managing the longitudinal care of the patient due to the serious and/or complex managed problem(s) elevated PSA and erectile dysfunction. The natural history of prostate cancer and ongoing controversy regarding screening and potential treatment outcomes of prostate cancer has been discussed with the patient. The meaning of a false positive PSA and a false negative PSA has been discussed. He indicates understanding of the limitations of this screening test.   - Uronav prostate biopsy negative on 11/6/24. Will continue to monitor PSA closely.   - RTC in 6 months with repeat PSA.   - Continue Cialis 10 mg as needed.   "

## 2025-02-02 NOTE — PROGRESS NOTES
SUBJECTIVE:    Patient ID: Korey Aguero is a 64 y.o. male.    Chief Complaint: Follow-up (6 mo f/u//no med bottles//declined flu vac//complains of right elbow pain for 2-3 mo//tc)    Pt here to checkup on acute and chronic conditions.      BP is doing ok.  Denies CP/SOB. Has been walking 3 miles daily.      Right arm rash is not bothering him right now.  Uses topical steroid when needed.      Right elbow bothers him some time. Has been using aleve otc cream that helps some. Has used a brace on the other arm at times so plans to use on the other arm.     Taking vitamin D, E, and C.    Taking nexium every day. Not having heartburn.      Has not been having issues with hemorrhoids.  Has been constipated.   [(Carola) nifedipine 2%/lidocaine 1%.]      Has been following with Dr. KAREEM Shaver for elevated PSA. Has been cleared s/p biopsy. Has another appt in May.     No recent labs.  -------------------------------------------  Cscope, hx polyp,  March 2022 (Carola)  Has had eye exam. (Alejandro)            Past Medical History:   Diagnosis Date    Hypertension     On mechanically assisted ventilation 12/13/2020    pneumonia     Social History     Socioeconomic History    Marital status:    Tobacco Use    Smoking status: Never    Smokeless tobacco: Never   Substance and Sexual Activity    Alcohol use: Yes    Drug use: Never     Social Drivers of Health     Financial Resource Strain: Low Risk  (1/29/2024)    Overall Financial Resource Strain (CARDIA)     Difficulty of Paying Living Expenses: Not hard at all   Food Insecurity: No Food Insecurity (1/29/2024)    Hunger Vital Sign     Worried About Running Out of Food in the Last Year: Never true     Ran Out of Food in the Last Year: Never true   Transportation Needs: No Transportation Needs (1/29/2024)    PRAPARE - Transportation     Lack of Transportation (Medical): No     Lack of Transportation (Non-Medical): No   Physical Activity: Sufficiently Active (1/29/2024)     Exercise Vital Sign     Days of Exercise per Week: 4 days     Minutes of Exercise per Session: 60 min   Stress: No Stress Concern Present (1/29/2024)    [employer] - Occupational Stress Questionnaire     Feeling of Stress : Only a little   Housing Stability: Low Risk  (1/29/2024)    Housing Stability Vital Sign     Unable to Pay for Housing in the Last Year: No     Number of Places Lived in the Last Year: 1     Unstable Housing in the Last Year: No     Past Surgical History:   Procedure Laterality Date    elbow surgery Left     TRANSRECTAL BIOPSY OF PROSTATE WITH ULTRASOUND GUIDANCE N/A 11/6/2024    Procedure: -uronav-BIOPSY, PROSTATE, RECTAL APPROACH, WITH US GUIDANCE;  Surgeon: Jasmin Shaver Jr., MD;  Location: [facility] OR;  Service: Urology;  Laterality: N/A;     Family History   Problem Relation Name Age of Onset    Diabetes Father      Breast cancer Mother      Breast cancer Sister      Breast cancer Sister         Review of patient's allergies indicates:   Allergen Reactions    Adhesive      Band-aide    Pcn [penicillins]        Current Outpatient Medications:     amLODIPine (NORVASC) 10 MG tablet, Take 1 tablet (10 mg total) by mouth once daily., Disp: 90 tablet, Rfl: 3    apple cider vinegar 300 mg Tab, Take by mouth., Disp: , Rfl:     benazepriL (LOTENSIN) 10 MG tablet, Take 1 tablet (10 mg total) by mouth once daily., Disp: 90 tablet, Rfl: 3    clobetasoL (TEMOVATE) 0.05 % cream, Aaa bid to skin x 2-3 weeks prn flare, Disp: 60 g, Rfl: 3    esomeprazole (NEXIUM) 20 MG capsule, Take 20 mg by mouth before breakfast. Every other day, Disp: , Rfl:     ketoconazole (NIZORAL) 2 % cream, Apply 1 application  topically once daily., Disp: , Rfl:     pregabalin (LYRICA) 50 MG capsule, Take 1-2 capsules ( mg total) by mouth 3 (three) times daily as needed (for pain)., Disp: 30 capsule, Rfl: 1    tadalafiL (CIALIS) 10 MG tablet, Take 1 tablet (10 mg total) by mouth daily as  "needed., Disp: 30 tablet, Rfl: 11    valACYclovir (VALTREX) 1000 MG tablet, Take 1 tablet (1,000 mg total) by mouth 2 (two) times daily. for 7 days, Disp: 14 tablet, Rfl: 0    vitamin D (VITAMIN D3) 1000 units Tab, Take 1,000 Units by mouth once daily., Disp: , Rfl:     Review of Systems   Constitutional:  Negative for activity change, appetite change and unexpected weight change.   HENT:  Negative for hearing loss, rhinorrhea and trouble swallowing.    Eyes:  Negative for discharge and visual disturbance.   Respiratory:  Negative for chest tightness, shortness of breath and wheezing.    Cardiovascular:  Negative for chest pain, palpitations and leg swelling.   Gastrointestinal:  Negative for blood in stool, constipation, diarrhea and vomiting.        -heartburn   Endocrine: Negative for polydipsia and polyuria.   Genitourinary:  Negative for decreased urine volume, difficulty urinating, dysuria, frequency, hematuria and urgency.   Musculoskeletal:  Negative for arthralgias, back pain, joint swelling and neck pain.   Neurological:  Negative for dizziness, weakness and headaches.   Hematological:  Does not bruise/bleed easily.   Psychiatric/Behavioral:  Negative for confusion, dysphoric mood, sleep disturbance and suicidal ideas. The patient is not nervous/anxious.    All other systems reviewed and are negative.         Objective:      Vitals:    02/03/25 0808   BP: 118/66   Temp: (!) 125.6 °F (52 °C)   Weight: 107 kg (236 lb)   Height: 5' 8" (1.727 m)   PF: 96 L/min             Wt Readings from Last 3 Encounters:   02/03/25 107 kg (236 lb)   11/22/24 106.1 kg (234 lb)   10/31/24 106.6 kg (235 lb 0.2 oz)       Physical Exam  Vitals reviewed.   Constitutional:       General: He is not in acute distress.     Appearance: Normal appearance. He is well-developed. He is obese.   HENT:      Head: Normocephalic and atraumatic.   Neck:      Thyroid: No thyromegaly.   Cardiovascular:      Rate and Rhythm: Normal rate and " regular rhythm.      Heart sounds: Normal heart sounds. No murmur heard.     No friction rub.   Pulmonary:      Effort: Pulmonary effort is normal.      Breath sounds: Normal breath sounds. No wheezing or rales.   Abdominal:      General: Bowel sounds are normal. There is no distension.      Palpations: Abdomen is soft.      Tenderness: There is no abdominal tenderness.   Musculoskeletal:      Cervical back: Neck supple.   Lymphadenopathy:      Cervical: No cervical adenopathy.   Skin:     General: Skin is warm and dry.      Findings: No rash.   Neurological:      Mental Status: He is alert and oriented to person, place, and time.      Motor: Motor function is intact.      Coordination: Coordination is intact.      Gait: Gait is intact.   Psychiatric:         Attention and Perception: He is attentive.         Speech: Speech normal.         Behavior: Behavior normal.         Thought Content: Thought content normal.         Judgment: Judgment normal.           Assessment:       1. HTN (hypertension), benign    2. Gastroesophageal reflux disease without esophagitis    3. Long-term use of high-risk medication                   Plan:       HTN (hypertension), benign  Comments:  Controlled. Will continue to monitor BP on current medicaiton regimen    Gastroesophageal reflux disease without esophagitis  Comments:  Controlled. Will continue to monitor symptoms on QOD nexium.    Long-term use of high-risk medication  -     TSH w/reflex to FT4; Future; Expected date: 02/03/2025  -     Urinalysis, Reflex to Urine Culture Urine, Clean Catch; Future; Expected date: 02/03/2025  -     CBC Auto Differential; Future; Expected date: 02/03/2025  -     Lipid Panel; Future; Expected date: 02/03/2025  -     Comprehensive Metabolic Panel; Future; Expected date: 02/03/2025  -     Microalbumin/Creatinine Ratio, Urine; Future; Expected date: 02/03/2025      Labs and/or tests have been ordered for the evaluation/monitoring of acute/chronic  conditions, to be done today.    Follow up in about 6 months (around 8/3/2025) for HTN, GERD, LABS.        2/3/2025 Nader Valiente

## 2025-02-03 ENCOUNTER — OFFICE VISIT (OUTPATIENT)
Dept: FAMILY MEDICINE | Facility: CLINIC | Age: 65
End: 2025-02-03
Payer: COMMERCIAL

## 2025-02-03 VITALS
HEIGHT: 68 IN | SYSTOLIC BLOOD PRESSURE: 118 MMHG | DIASTOLIC BLOOD PRESSURE: 66 MMHG | TEMPERATURE: 126 F | BODY MASS INDEX: 35.77 KG/M2 | WEIGHT: 236 LBS

## 2025-02-03 DIAGNOSIS — Z79.899 LONG-TERM USE OF HIGH-RISK MEDICATION: ICD-10-CM

## 2025-02-03 DIAGNOSIS — I10 HTN (HYPERTENSION), BENIGN: Primary | ICD-10-CM

## 2025-02-03 DIAGNOSIS — K21.9 GASTROESOPHAGEAL REFLUX DISEASE WITHOUT ESOPHAGITIS: ICD-10-CM

## 2025-02-03 PROCEDURE — 1159F MED LIST DOCD IN RCRD: CPT | Mod: CPTII,S$GLB,, | Performed by: FAMILY MEDICINE

## 2025-02-03 PROCEDURE — 3078F DIAST BP <80 MM HG: CPT | Mod: CPTII,S$GLB,, | Performed by: FAMILY MEDICINE

## 2025-02-03 PROCEDURE — 99214 OFFICE O/P EST MOD 30 MIN: CPT | Mod: S$GLB,,, | Performed by: FAMILY MEDICINE

## 2025-02-03 PROCEDURE — 1160F RVW MEDS BY RX/DR IN RCRD: CPT | Mod: CPTII,S$GLB,, | Performed by: FAMILY MEDICINE

## 2025-02-03 PROCEDURE — 3008F BODY MASS INDEX DOCD: CPT | Mod: CPTII,S$GLB,, | Performed by: FAMILY MEDICINE

## 2025-02-03 PROCEDURE — 3074F SYST BP LT 130 MM HG: CPT | Mod: CPTII,S$GLB,, | Performed by: FAMILY MEDICINE

## 2025-04-01 DIAGNOSIS — I10 HTN (HYPERTENSION), BENIGN: ICD-10-CM

## 2025-04-01 RX ORDER — AMLODIPINE BESYLATE 10 MG/1
10 TABLET ORAL DAILY
Qty: 90 TABLET | Refills: 3 | Status: SHIPPED | OUTPATIENT
Start: 2025-04-01

## 2025-04-01 NOTE — TELEPHONE ENCOUNTER
----- Message from Ria sent at 4/1/2025  9:29 AM CDT -----  Refill on amlodipine [facility] in [place] [address] [phone]

## 2025-05-22 ENCOUNTER — OFFICE VISIT (OUTPATIENT)
Dept: UROLOGY | Facility: CLINIC | Age: 65
End: 2025-05-22
Payer: COMMERCIAL

## 2025-05-22 ENCOUNTER — LAB VISIT (OUTPATIENT)
Dept: LAB | Facility: HOSPITAL | Age: 65
End: 2025-05-22
Attending: UROLOGY
Payer: COMMERCIAL

## 2025-05-22 VITALS — HEIGHT: 68 IN | WEIGHT: 240 LBS | BODY MASS INDEX: 36.37 KG/M2

## 2025-05-22 DIAGNOSIS — N52.8 OTHER MALE ERECTILE DYSFUNCTION: ICD-10-CM

## 2025-05-22 DIAGNOSIS — R97.20 ELEVATED PSA: ICD-10-CM

## 2025-05-22 DIAGNOSIS — R97.20 ELEVATED PSA: Primary | ICD-10-CM

## 2025-05-22 LAB — PSA SERPL-MCNC: 12.78 NG/ML (ref ?–4)

## 2025-05-22 PROCEDURE — 1160F RVW MEDS BY RX/DR IN RCRD: CPT | Mod: CPTII,S$GLB,, | Performed by: UROLOGY

## 2025-05-22 PROCEDURE — 36415 COLL VENOUS BLD VENIPUNCTURE: CPT

## 2025-05-22 PROCEDURE — 1159F MED LIST DOCD IN RCRD: CPT | Mod: CPTII,S$GLB,, | Performed by: UROLOGY

## 2025-05-22 PROCEDURE — 3008F BODY MASS INDEX DOCD: CPT | Mod: CPTII,S$GLB,, | Performed by: UROLOGY

## 2025-05-22 PROCEDURE — 84153 ASSAY OF PSA TOTAL: CPT

## 2025-05-22 PROCEDURE — 99999 PR PBB SHADOW E&M-EST. PATIENT-LVL II: CPT | Mod: PBBFAC,,, | Performed by: UROLOGY

## 2025-05-22 PROCEDURE — G2211 COMPLEX E/M VISIT ADD ON: HCPCS | Mod: S$GLB,,, | Performed by: UROLOGY

## 2025-05-22 PROCEDURE — 99214 OFFICE O/P EST MOD 30 MIN: CPT | Mod: S$GLB,,, | Performed by: UROLOGY

## 2025-05-22 PROCEDURE — 4010F ACE/ARB THERAPY RXD/TAKEN: CPT | Mod: CPTII,S$GLB,, | Performed by: UROLOGY

## 2025-05-22 NOTE — PROGRESS NOTES
[facility] Established Patient/H&P:    Korey Aguero is a 64 y.o. male who presents for follow up for elevated PSA and erectile dysfunction.     Patient with a history of HTN and gout who was referred for an elevated PSA. On review of records, his PSA is 6.69. No prior PSA for comparison. States he has never been told that he had an elevated PSA in the past.      No known family history of prostate cancer. Mother and two sisters with breast cancer.      He reports mild lower urinary tract symptoms - intermittency, urgency and nocturia x 2. Not bothered by his symptoms and not on any medication. Urinalysis negative.      Patient also with a several year history of erectile dysfunction. He has trouble obtaining and maintaining an erection. Has tried Viagra, but he had tachycardia so discontinued.      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         Interval History     11/22/24: Repeat PSA up to 7.3. MRI prostate on 9/6/24 with a PI-RADs 4 lesion in the right peripheral zone. He is now s/p Uronav prostate biopsy which was negative for malignancy on 11/6/24. Doing well after biopsy with no complaints. He was prescribed Cialis 10 mg at his initial visit in 8/2024. Good erectile function.     5/22/25: Here today for follow up. Has not had a repeat PSA. Remains on Cialis 10 mg with good function. No significant lower urinary tract symptoms.      Denies any fever, chills, gross hematuria, flank pain, bone pain, unintentional weight loss,  trauma or history of  malignancy.         PSA  7.3                   8/29/24  6.69                 7/29/24     IPSS QoL  8 2          8/8/24       Past Medical History:   Diagnosis Date    Hypertension     On mechanically assisted ventilation 12/13/2020    pneumonia       Past Surgical History:   Procedure Laterality Date    elbow surgery Left     TRANSRECTAL BIOPSY OF PROSTATE WITH ULTRASOUND GUIDANCE N/A 11/6/2024    Procedure: -uronav-BIOPSY, PROSTATE,  "RECTAL APPROACH, WITH US GUIDANCE;  Surgeon: Jasmin Shaver Jr., MD;  Location: [facility] OR;  Service: Urology;  Laterality: N/A;       Review of patient's allergies indicates:   Allergen Reactions    Adhesive      Band-aide    Pcn [penicillins]        Medications Reviewed: see MAR    FOCUSED PHYSICAL EXAM:    There were no vitals filed for this visit.  Body mass index is 36.49 kg/m². Weight: 108.9 kg (240 lb) Height: 5' 8" (172.7 cm)       General: Alert, cooperative, no distress, appears stated age  Abdomen: Soft, non-tender, no CVA tenderness, non-distended      LABS:    No results found for this or any previous visit (from the past 2 weeks).        Assessment/Diagnosis:    1. Elevated PSA  Prostate Specific Antigen, Diagnostic      2. Other male erectile dysfunction            Plans:    - I spent 30 minutes of the day of this encounter preparing for, treating and managing this patient. Visit today included increased complexity associated with the care of the episodic problem addressed and managing the longitudinal care of the patient due to the serious and/or complex managed problem(s) elevated PSA and erectile dysfunction. The natural history of prostate cancer and ongoing controversy regarding screening and potential treatment outcomes of prostate cancer has been discussed with the patient. The meaning of a false positive PSA and a false negative PSA has been discussed. He indicates understanding of the limitations of this screening test.   - Uronav prostate biopsy negative on 11/6/24. Will continue to monitor PSA closely.  - PSA today.   - Continue Cialis 10 mg as needed.   - RTC in 1 year if PSA stable.     "

## 2025-05-23 ENCOUNTER — RESULTS FOLLOW-UP (OUTPATIENT)
Dept: UROLOGY | Facility: CLINIC | Age: 65
End: 2025-05-23

## 2025-06-03 DIAGNOSIS — I10 HTN (HYPERTENSION), BENIGN: ICD-10-CM

## 2025-06-04 RX ORDER — BENAZEPRIL HYDROCHLORIDE 10 MG/1
10 TABLET ORAL DAILY
Qty: 90 TABLET | Refills: 3 | Status: SHIPPED | OUTPATIENT
Start: 2025-06-04

## 2025-06-26 ENCOUNTER — OFFICE VISIT (OUTPATIENT)
Dept: UROLOGY | Facility: CLINIC | Age: 65
End: 2025-06-26
Payer: COMMERCIAL

## 2025-06-26 DIAGNOSIS — R97.20 ELEVATED PSA: Primary | ICD-10-CM

## 2025-06-26 NOTE — PROGRESS NOTES
Audio Only Telehealth Visit     The patient location is: Home  The chief complaint leading to consultation is: Elevated PSA  Visit type: Virtual visit with audio only (telephone)  Total time spent in medical discussion with patient: 15 minutes  Total time spent on date of the encounter: 30 minutes       The reason for the audio only service rather than synchronous audio and video virtual visit was related to technical difficulties or patient preference/necessity.       Each patient to whom I provide medical services by telemedicine is:  (1) informed of the relationship between the physician and patient and the respective role of any other health care provider with respect to management of the patient; and (2) notified that they may decline to receive medical services by telemedicine and may withdraw from such care at any time. Patient verbally consented to receive this service via voice-only telephone call.       [facility] Established Patient/H&P:    Korey Aguero is a 65 y.o. male who presents for follow up for elevated PSA and erectile dysfunction.     Patient with a history of HTN and gout who was referred for an elevated PSA. On review of records, his PSA is 6.69. No prior PSA for comparison. States he has never been told that he had an elevated PSA in the past.      No known family history of prostate cancer. Mother and two sisters with breast cancer.      He reports mild lower urinary tract symptoms - intermittency, urgency and nocturia x 2. Not bothered by his symptoms and not on any medication. Urinalysis negative.      Patient also with a several year history of erectile dysfunction. He has trouble obtaining and maintaining an erection. Has tried Viagra, but he had tachycardia so discontinued.      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         Interval History     11/22/24: Repeat PSA up to 7.3. MRI prostate on 9/6/24 with a PI-RADs 4 lesion in the right peripheral zone. He is  now s/p Uronav prostate biopsy which was negative for malignancy on 11/6/24. Doing well after biopsy with no complaints. He was prescribed Cialis 10 mg at his initial visit in 8/2024. Good erectile function.      5/22/25: Here today for follow up. Has not had a repeat PSA. Remains on Cialis 10 mg with good function. No significant lower urinary tract symptoms.     6/26/25: Virtual audio today for follow up. PSA has increased to 12.78 on 5/22/25. No new complaints.      Denies any fever, chills, gross hematuria, flank pain, bone pain, unintentional weight loss,  trauma or history of  malignancy.         PSA  12.78  5/22/25  7.3                   8/29/24  6.69                 7/29/24     IPSS QoL  8          2          8/8/24    Past Medical History:   Diagnosis Date    Hypertension     On mechanically assisted ventilation 12/13/2020    pneumonia       Past Surgical History:   Procedure Laterality Date    elbow surgery Left     TRANSRECTAL BIOPSY OF PROSTATE WITH ULTRASOUND GUIDANCE N/A 11/6/2024    Procedure: -uronav-BIOPSY, PROSTATE, RECTAL APPROACH, WITH US GUIDANCE;  Surgeon: Jasmin Shaver Jr., MD;  Location: [facility] OR;  Service: Urology;  Laterality: N/A;       Review of patient's allergies indicates:   Allergen Reactions    Adhesive      Band-aide    Pcn [penicillins]        Medications Reviewed: see MAR    FOCUSED PHYSICAL EXAM:    There were no vitals filed for this visit.  There is no height or weight on file to calculate BMI.             LABS:    No results found for this or any previous visit (from the past 2 weeks).        Assessment/Diagnosis:    1. Elevated PSA  Creatinine, Serum    MRI Prostate W W/O Contrast          Plans:    - Visit today included increased complexity associated with the care of the episodic problem addressed and managing the longitudinal care of the patient due to the serious and/or complex managed problem(s) elevated PSA. The natural history of prostate cancer and ongoing  controversy regarding screening and potential treatment outcomes of prostate cancer has been discussed with the patient. The meaning of a false positive PSA and a false negative PSA has been discussed. He indicates understanding of the limitations of this screening test.   - MRI prostate next available. Biopsy WITH sedation contingent on results.      This service was not originating from a related E/M service provided within the previous 7 days nor will  to an E/M service or procedure within the next 24 hours or my soonest available appointment.  Prevailing standard of care was able to be met in this audio-only visit.

## 2025-07-07 ENCOUNTER — HOSPITAL ENCOUNTER (OUTPATIENT)
Dept: RADIOLOGY | Facility: HOSPITAL | Age: 65
Discharge: HOME | End: 2025-07-07
Attending: UROLOGY
Payer: COMMERCIAL

## 2025-07-07 DIAGNOSIS — R97.20 ELEVATED PSA: ICD-10-CM

## 2025-07-07 PROCEDURE — A9585 GADOBUTROL INJECTION: HCPCS

## 2025-07-07 PROCEDURE — 72197 MRI PELVIS W/O & W/DYE: CPT | Mod: 26,,, | Performed by: RADIOLOGY

## 2025-07-07 PROCEDURE — 72197 MRI PELVIS W/O & W/DYE: CPT | Mod: TC

## 2025-07-07 PROCEDURE — 25500020 PHARM REV CODE 255

## 2025-07-07 RX ORDER — GADOBUTROL 604.72 MG/ML
INJECTION INTRAVENOUS
Status: COMPLETED
Start: 2025-07-07 | End: 2025-07-07

## 2025-07-07 RX ADMIN — GADOBUTROL 10 ML: 604.72 INJECTION INTRAVENOUS at 02:07

## 2025-07-29 ENCOUNTER — TELEPHONE (OUTPATIENT)
Dept: FAMILY MEDICINE | Facility: CLINIC | Age: 65
End: 2025-07-29
Payer: COMMERCIAL

## 2025-08-11 ENCOUNTER — OFFICE VISIT (OUTPATIENT)
Dept: FAMILY MEDICINE | Facility: CLINIC | Age: 65
End: 2025-08-11
Payer: COMMERCIAL

## 2025-08-11 VITALS
OXYGEN SATURATION: 95 % | BODY MASS INDEX: 36.55 KG/M2 | HEART RATE: 45 BPM | HEIGHT: 68 IN | WEIGHT: 241.19 LBS | SYSTOLIC BLOOD PRESSURE: 124 MMHG | DIASTOLIC BLOOD PRESSURE: 70 MMHG

## 2025-08-11 DIAGNOSIS — R00.1 BRADYCARDIA: ICD-10-CM

## 2025-08-11 DIAGNOSIS — R97.20 ELEVATED PSA: ICD-10-CM

## 2025-08-11 DIAGNOSIS — K21.9 GASTROESOPHAGEAL REFLUX DISEASE WITHOUT ESOPHAGITIS: ICD-10-CM

## 2025-08-11 DIAGNOSIS — I10 HTN (HYPERTENSION), BENIGN: Primary | ICD-10-CM

## 2025-08-11 DIAGNOSIS — E78.2 MIXED HYPERLIPIDEMIA: ICD-10-CM

## 2025-08-11 DIAGNOSIS — K59.09 OTHER CONSTIPATION: ICD-10-CM

## 2025-08-11 PROCEDURE — 3066F NEPHROPATHY DOC TX: CPT | Mod: CPTII,S$GLB,, | Performed by: FAMILY MEDICINE

## 2025-08-11 PROCEDURE — 3078F DIAST BP <80 MM HG: CPT | Mod: CPTII,S$GLB,, | Performed by: FAMILY MEDICINE

## 2025-08-11 PROCEDURE — 1159F MED LIST DOCD IN RCRD: CPT | Mod: CPTII,S$GLB,, | Performed by: FAMILY MEDICINE

## 2025-08-11 PROCEDURE — 99214 OFFICE O/P EST MOD 30 MIN: CPT | Mod: S$GLB,,, | Performed by: FAMILY MEDICINE

## 2025-08-11 PROCEDURE — 1160F RVW MEDS BY RX/DR IN RCRD: CPT | Mod: CPTII,S$GLB,, | Performed by: FAMILY MEDICINE

## 2025-08-11 PROCEDURE — 4010F ACE/ARB THERAPY RXD/TAKEN: CPT | Mod: CPTII,S$GLB,, | Performed by: FAMILY MEDICINE

## 2025-08-11 PROCEDURE — 3008F BODY MASS INDEX DOCD: CPT | Mod: CPTII,S$GLB,, | Performed by: FAMILY MEDICINE

## 2025-08-11 PROCEDURE — 3074F SYST BP LT 130 MM HG: CPT | Mod: CPTII,S$GLB,, | Performed by: FAMILY MEDICINE

## 2025-08-11 PROCEDURE — 3061F NEG MICROALBUMINURIA REV: CPT | Mod: CPTII,S$GLB,, | Performed by: FAMILY MEDICINE

## (undated) DEVICE — COVER TRANSDUCER LATEX N/STERI

## (undated) DEVICE — GUN BIOPSY 18GA MONOPLY

## (undated) DEVICE — GUIDE BIOPSY BIPLANAR 18G

## (undated) DEVICE — COVER PROBE 3D/4D